# Patient Record
Sex: MALE | Race: WHITE | NOT HISPANIC OR LATINO | Employment: OTHER | URBAN - METROPOLITAN AREA
[De-identification: names, ages, dates, MRNs, and addresses within clinical notes are randomized per-mention and may not be internally consistent; named-entity substitution may affect disease eponyms.]

---

## 2021-01-05 ENCOUNTER — HOSPITAL ENCOUNTER (OUTPATIENT)
Dept: RADIOLOGY | Facility: HOSPITAL | Age: 66
Discharge: HOME | End: 2021-01-05
Attending: ORTHOPAEDIC SURGERY
Payer: COMMERCIAL

## 2021-01-05 ENCOUNTER — OFFICE VISIT (OUTPATIENT)
Dept: ORTHOPEDICS | Facility: CLINIC | Age: 66
End: 2021-01-05
Payer: COMMERCIAL

## 2021-01-05 VITALS
HEART RATE: 62 BPM | HEIGHT: 69 IN | DIASTOLIC BLOOD PRESSURE: 97 MMHG | BODY MASS INDEX: 43.3 KG/M2 | SYSTOLIC BLOOD PRESSURE: 171 MMHG | WEIGHT: 292.38 LBS

## 2021-01-05 DIAGNOSIS — M25.562 LEFT KNEE PAIN, UNSPECIFIED CHRONICITY: Primary | ICD-10-CM

## 2021-01-05 DIAGNOSIS — M25.562 LEFT KNEE PAIN, UNSPECIFIED CHRONICITY: ICD-10-CM

## 2021-01-05 DIAGNOSIS — M25.561 RIGHT KNEE PAIN, UNSPECIFIED CHRONICITY: ICD-10-CM

## 2021-01-05 DIAGNOSIS — M17.12 PRIMARY OSTEOARTHRITIS OF LEFT KNEE: ICD-10-CM

## 2021-01-05 DIAGNOSIS — M17.11 PRIMARY OSTEOARTHRITIS OF RIGHT KNEE: ICD-10-CM

## 2021-01-05 PROCEDURE — 3008F BODY MASS INDEX DOCD: CPT | Mod: CPTII,S$GLB,, | Performed by: ORTHOPAEDIC SURGERY

## 2021-01-05 PROCEDURE — 73564 X-RAY EXAM KNEE 4 OR MORE: CPT | Mod: TC,50,FY

## 2021-01-05 PROCEDURE — 20610 DRAIN/INJ JOINT/BURSA W/O US: CPT | Mod: 50,S$GLB,, | Performed by: ORTHOPAEDIC SURGERY

## 2021-01-05 PROCEDURE — 99999 PR PBB SHADOW E&M-NEW PATIENT-LVL III: ICD-10-PCS | Mod: PBBFAC,,, | Performed by: ORTHOPAEDIC SURGERY

## 2021-01-05 PROCEDURE — 73564 XR KNEE COMP 4 OR MORE VIEWS BILAT: ICD-10-PCS | Mod: 26,,, | Performed by: RADIOLOGY

## 2021-01-05 PROCEDURE — 1125F PR PAIN SEVERITY QUANTIFIED, PAIN PRESENT: ICD-10-PCS | Mod: S$GLB,,, | Performed by: ORTHOPAEDIC SURGERY

## 2021-01-05 PROCEDURE — 73564 X-RAY EXAM KNEE 4 OR MORE: CPT | Mod: 26,,, | Performed by: RADIOLOGY

## 2021-01-05 PROCEDURE — 20610 LARGE JOINT ASPIRATION/INJECTION: BILATERAL KNEE: ICD-10-PCS | Mod: 50,S$GLB,, | Performed by: ORTHOPAEDIC SURGERY

## 2021-01-05 PROCEDURE — 99244 OFF/OP CNSLTJ NEW/EST MOD 40: CPT | Mod: 25,S$GLB,, | Performed by: ORTHOPAEDIC SURGERY

## 2021-01-05 PROCEDURE — 99999 PR PBB SHADOW E&M-NEW PATIENT-LVL III: CPT | Mod: PBBFAC,,, | Performed by: ORTHOPAEDIC SURGERY

## 2021-01-05 PROCEDURE — 99244 PR OFFICE CONSULTATION,LEVEL IV: ICD-10-PCS | Mod: 25,S$GLB,, | Performed by: ORTHOPAEDIC SURGERY

## 2021-01-05 PROCEDURE — 1125F AMNT PAIN NOTED PAIN PRSNT: CPT | Mod: S$GLB,,, | Performed by: ORTHOPAEDIC SURGERY

## 2021-01-05 PROCEDURE — 3008F PR BODY MASS INDEX (BMI) DOCUMENTED: ICD-10-PCS | Mod: CPTII,S$GLB,, | Performed by: ORTHOPAEDIC SURGERY

## 2021-01-05 RX ORDER — AMLODIPINE BESYLATE 2.5 MG/1
TABLET ORAL
COMMUNITY
Start: 2020-12-30

## 2021-01-05 RX ORDER — LIDOCAINE HYDROCHLORIDE 10 MG/ML
2 INJECTION INFILTRATION; PERINEURAL
Status: DISCONTINUED | OUTPATIENT
Start: 2021-01-05 | End: 2021-01-05 | Stop reason: HOSPADM

## 2021-01-05 RX ORDER — SIMVASTATIN 20 MG/1
TABLET, FILM COATED ORAL
COMMUNITY
Start: 2020-12-06 | End: 2023-08-31

## 2021-01-05 RX ORDER — METOPROLOL SUCCINATE 100 MG/1
TABLET, EXTENDED RELEASE ORAL
COMMUNITY
Start: 2020-12-13

## 2021-01-05 RX ORDER — KETOROLAC TROMETHAMINE 30 MG/ML
30 INJECTION, SOLUTION INTRAMUSCULAR; INTRAVENOUS
Status: DISCONTINUED | OUTPATIENT
Start: 2021-01-05 | End: 2021-01-05 | Stop reason: HOSPADM

## 2021-01-05 RX ORDER — BUPIVACAINE HYDROCHLORIDE 5 MG/ML
10 INJECTION, SOLUTION PERINEURAL
Status: DISCONTINUED | OUTPATIENT
Start: 2021-01-05 | End: 2021-01-05 | Stop reason: HOSPADM

## 2021-01-05 RX ORDER — LISINOPRIL AND HYDROCHLOROTHIAZIDE 12.5; 2 MG/1; MG/1
TABLET ORAL
COMMUNITY
Start: 2020-12-23

## 2021-01-05 RX ADMIN — BUPIVACAINE HYDROCHLORIDE 10 ML: 5 INJECTION, SOLUTION PERINEURAL at 01:01

## 2021-01-05 RX ADMIN — KETOROLAC TROMETHAMINE 30 MG: 30 INJECTION, SOLUTION INTRAMUSCULAR; INTRAVENOUS at 01:01

## 2021-01-05 RX ADMIN — LIDOCAINE HYDROCHLORIDE 2 ML: 10 INJECTION INFILTRATION; PERINEURAL at 01:01

## 2023-02-02 ENCOUNTER — OFFICE VISIT (OUTPATIENT)
Dept: ORTHOPEDICS | Facility: CLINIC | Age: 68
End: 2023-02-02
Payer: MEDICARE

## 2023-02-02 VITALS
WEIGHT: 273.81 LBS | HEIGHT: 69 IN | SYSTOLIC BLOOD PRESSURE: 151 MMHG | HEART RATE: 65 BPM | DIASTOLIC BLOOD PRESSURE: 72 MMHG | BODY MASS INDEX: 40.56 KG/M2

## 2023-02-02 DIAGNOSIS — G89.29 CHRONIC PAIN OF RIGHT KNEE: ICD-10-CM

## 2023-02-02 DIAGNOSIS — M17.11 PRIMARY OSTEOARTHRITIS OF RIGHT KNEE: Primary | ICD-10-CM

## 2023-02-02 DIAGNOSIS — M25.561 CHRONIC PAIN OF RIGHT KNEE: ICD-10-CM

## 2023-02-02 PROCEDURE — 20610 DRAIN/INJ JOINT/BURSA W/O US: CPT | Mod: PBBFAC,RT | Performed by: ORTHOPAEDIC SURGERY

## 2023-02-02 PROCEDURE — 20610 LARGE JOINT ASPIRATION/INJECTION: R KNEE: ICD-10-PCS | Mod: S$PBB,RT,, | Performed by: ORTHOPAEDIC SURGERY

## 2023-02-02 PROCEDURE — 99213 PR OFFICE/OUTPT VISIT, EST, LEVL III, 20-29 MIN: ICD-10-PCS | Mod: 25,S$PBB,, | Performed by: ORTHOPAEDIC SURGERY

## 2023-02-02 PROCEDURE — 99999 PR PBB SHADOW E&M-EST. PATIENT-LVL IV: CPT | Mod: PBBFAC,,, | Performed by: ORTHOPAEDIC SURGERY

## 2023-02-02 PROCEDURE — 20610 DRAIN/INJ JOINT/BURSA W/O US: CPT | Mod: PBBFAC,PN | Performed by: ORTHOPAEDIC SURGERY

## 2023-02-02 PROCEDURE — 99999 PR PBB SHADOW E&M-EST. PATIENT-LVL IV: ICD-10-PCS | Mod: PBBFAC,,, | Performed by: ORTHOPAEDIC SURGERY

## 2023-02-02 PROCEDURE — 99213 OFFICE O/P EST LOW 20 MIN: CPT | Mod: 25,S$PBB,, | Performed by: ORTHOPAEDIC SURGERY

## 2023-02-02 PROCEDURE — 99214 OFFICE O/P EST MOD 30 MIN: CPT | Mod: PBBFAC,PN,25 | Performed by: ORTHOPAEDIC SURGERY

## 2023-02-02 RX ORDER — KETOROLAC TROMETHAMINE 30 MG/ML
30 INJECTION, SOLUTION INTRAMUSCULAR; INTRAVENOUS
Status: DISCONTINUED | OUTPATIENT
Start: 2023-02-02 | End: 2023-02-02 | Stop reason: HOSPADM

## 2023-02-02 RX ORDER — BUPIVACAINE HYDROCHLORIDE 5 MG/ML
5 INJECTION, SOLUTION PERINEURAL
Status: DISCONTINUED | OUTPATIENT
Start: 2023-02-02 | End: 2023-02-02 | Stop reason: HOSPADM

## 2023-02-02 RX ORDER — LIDOCAINE HYDROCHLORIDE 10 MG/ML
4 INJECTION INFILTRATION; PERINEURAL
Status: DISCONTINUED | OUTPATIENT
Start: 2023-02-02 | End: 2023-02-02 | Stop reason: HOSPADM

## 2023-02-02 RX ADMIN — LIDOCAINE HYDROCHLORIDE 4 ML: 10 INJECTION INFILTRATION; PERINEURAL at 01:02

## 2023-02-02 RX ADMIN — BUPIVACAINE HYDROCHLORIDE 5 ML: 5 INJECTION, SOLUTION PERINEURAL at 01:02

## 2023-02-02 RX ADMIN — KETOROLAC TROMETHAMINE 30 MG: 30 INJECTION, SOLUTION INTRAMUSCULAR; INTRAVENOUS at 01:02

## 2023-02-02 NOTE — PROGRESS NOTES
[facility] [address]          Subjective:      Patient ID: Diallo Castano is a 67 y.o. male.    Chief Complaint: Pain and Swelling of the Right Knee    Diallo Castano is a 67-year-old male with a PMHx HTN who presents to clinic today for complaint of chronic bilateral knee pain (R > L). Occurring now for the past 2 1/2 years. Pain is diffusely over the knees, worse with prolonged ambulation or prolonged times of inactivity. Endorses associated knee stifiness worse after prolonged immobility. Also endorses swelling of the right knee. Endorses he mostly does not take anything for pain but will occassionally take tylenol or aspirin. Endorses history of past knee procedures around 20 years ago on both the right and left knees which sounds like meniscal debridements. Patient was seen in ortho clinic in January 2021 for initial ortho evaluation. Received Torodol injections to both knees but says the pain relief only lasted around 1 week.         Past Medical History:   Diagnosis Date    Arthritis         Past Surgical History:   Procedure Laterality Date    KNEE SURGERY          Current Outpatient Medications   Medication Instructions    amLODIPine (NORVASC) 2.5 MG tablet No dose, route, or frequency recorded.    lisinopriL-hydrochlorothiazide (PRINZIDE,ZESTORETIC) 20-12.5 mg per tablet No dose, route, or frequency recorded.    metoprolol succinate (TOPROL-XL) 100 MG 24 hr tablet No dose, route, or frequency recorded.    simvastatin (ZOCOR) 20 MG tablet No dose, route, or frequency recorded.        Review of patient's allergies indicates:  No Known Allergies    Social History     Socioeconomic History    Marital status:    Tobacco Use    Smoking status: Never    Smokeless tobacco: Never   Substance and Sexual Activity    Alcohol use: Yes    Drug use: Never   Social History Narrative    ** Merged History Encounter **            History reviewed. No pertinent family history.      Review of Systems    Constitutional: Negative for chills and fever.   HENT:  Negative for congestion.    Musculoskeletal:  Positive for arthritis, joint pain, joint swelling and stiffness. Negative for back pain and falls.   Gastrointestinal:  Negative for abdominal pain.   Neurological:  Negative for paresthesias.           Objective:        General    Constitutional: He is oriented to person, place, and time. He appears well-developed.   HENT:   Head: Normocephalic and atraumatic.   Right Ear: External ear normal.   Left Ear: External ear normal.   Eyes: EOM are normal.   Cardiovascular:  Intact distal pulses.            Neurological: He is alert and oriented to person, place, and time.   Psychiatric: He has a normal mood and affect. His behavior is normal. Judgment and thought content normal.     General Musculoskeletal Exam   Gait: antalgic and abnormal       Right Knee Exam     Inspection   Swelling: present  Effusion: present    Tenderness   The patient is tender to palpation of the medial joint line.    Crepitus   The patient has crepitus of the patella.    Range of Motion   Flexion:  abnormal     Other   Sensation: normal    Muscle Strength   Right Lower Extremity   Quadriceps:  4/5   Hamstrin/5       we injected the right knee w 1cc of ketorolac, marcaine and lidocaine under sterile conditions with the patient's informed consent for severe bone on bone knee oa . Will plan for tka in may

## 2023-02-02 NOTE — PROCEDURES
Large Joint Aspiration/Injection: R knee    Date/Time: 2/2/2023 1:30 PM  Performed by: Lowell Woodson MD  Authorized by: Lowell Woodson MD     Consent Done?:  Yes (Verbal)  Indications:  Arthritis  Site marked: the procedure site was marked    Timeout: prior to procedure the correct patient, procedure, and site was verified    Prep: patient was prepped and draped in usual sterile fashion      Local anesthesia used?: Yes    Local anesthetic:  Topical anesthetic    Details:  Needle Size:  22 G  Ultrasonic Guidance for needle placement?: No    Approach:  Anterolateral  Location:  Knee  Site:  R knee  Medications:  30 mg ketorolac 30 mg/mL (1 mL); 5 mL BUPivacaine 0.5 % (5 mg/mL); 4 mL LIDOcaine HCL 10 mg/ml (1%) 10 mg/mL (1 %)  Patient tolerance:  Patient tolerated the procedure well with no immediate complications

## 2023-03-30 ENCOUNTER — OFFICE VISIT (OUTPATIENT)
Dept: ORTHOPEDICS | Facility: CLINIC | Age: 68
End: 2023-03-30
Payer: MEDICARE

## 2023-03-30 ENCOUNTER — LAB VISIT (OUTPATIENT)
Dept: LAB | Facility: HOSPITAL | Age: 68
End: 2023-03-30
Attending: ORTHOPAEDIC SURGERY
Payer: MEDICARE

## 2023-03-30 VITALS
HEART RATE: 62 BPM | SYSTOLIC BLOOD PRESSURE: 164 MMHG | DIASTOLIC BLOOD PRESSURE: 95 MMHG | BODY MASS INDEX: 41.37 KG/M2 | WEIGHT: 279.31 LBS | HEIGHT: 69 IN

## 2023-03-30 DIAGNOSIS — M25.561 CHRONIC PAIN OF RIGHT KNEE: ICD-10-CM

## 2023-03-30 DIAGNOSIS — G89.29 CHRONIC PAIN OF RIGHT KNEE: ICD-10-CM

## 2023-03-30 DIAGNOSIS — M17.11 PRIMARY OSTEOARTHRITIS OF RIGHT KNEE: Primary | ICD-10-CM

## 2023-03-30 DIAGNOSIS — M17.11 PRIMARY OSTEOARTHRITIS OF RIGHT KNEE: ICD-10-CM

## 2023-03-30 LAB
ALBUMIN SERPL BCP-MCNC: 4.6 G/DL (ref 3.5–5.2)
ALBUMIN SERPL BCP-MCNC: 4.6 G/DL (ref 3.5–5.2)
ALP SERPL-CCNC: 41 U/L (ref 55–135)
ALT SERPL W/O P-5'-P-CCNC: 41 U/L (ref 10–44)
ANION GAP SERPL CALC-SCNC: 13 MMOL/L (ref 8–16)
AST SERPL-CCNC: 22 U/L (ref 10–40)
BASOPHILS # BLD AUTO: 0.04 K/UL (ref 0–0.2)
BASOPHILS NFR BLD: 0.5 % (ref 0–1.9)
BILIRUB SERPL-MCNC: 0.7 MG/DL (ref 0.1–1)
BUN SERPL-MCNC: 16 MG/DL (ref 8–23)
CALCIUM SERPL-MCNC: 9.5 MG/DL (ref 8.7–10.5)
CHLORIDE SERPL-SCNC: 102 MMOL/L (ref 95–110)
CO2 SERPL-SCNC: 25 MMOL/L (ref 23–29)
CREAT SERPL-MCNC: 1 MG/DL (ref 0.5–1.4)
CRP SERPL-MCNC: 0.8 MG/L (ref 0–8.2)
DIFFERENTIAL METHOD: ABNORMAL
EOSINOPHIL # BLD AUTO: 0.1 K/UL (ref 0–0.5)
EOSINOPHIL NFR BLD: 1.7 % (ref 0–8)
ERYTHROCYTE [DISTWIDTH] IN BLOOD BY AUTOMATED COUNT: 13.2 % (ref 11.5–14.5)
ERYTHROCYTE [SEDIMENTATION RATE] IN BLOOD BY PHOTOMETRIC METHOD: 6 MM/HR (ref 0–23)
EST. GFR  (NO RACE VARIABLE): >60 ML/MIN/1.73 M^2
GLUCOSE SERPL-MCNC: 113 MG/DL (ref 70–110)
HCT VFR BLD AUTO: 45.3 % (ref 40–54)
HGB BLD-MCNC: 15.3 G/DL (ref 14–18)
IMM GRANULOCYTES # BLD AUTO: 0.02 K/UL (ref 0–0.04)
IMM GRANULOCYTES NFR BLD AUTO: 0.3 % (ref 0–0.5)
LYMPHOCYTES # BLD AUTO: 2.8 K/UL (ref 1–4.8)
LYMPHOCYTES NFR BLD: 37 % (ref 18–48)
MCH RBC QN AUTO: 32.3 PG (ref 27–31)
MCHC RBC AUTO-ENTMCNC: 33.8 G/DL (ref 32–36)
MCV RBC AUTO: 96 FL (ref 82–98)
MONOCYTES # BLD AUTO: 0.8 K/UL (ref 0.3–1)
MONOCYTES NFR BLD: 10.1 % (ref 4–15)
NEUTROPHILS # BLD AUTO: 3.8 K/UL (ref 1.8–7.7)
NEUTROPHILS NFR BLD: 50.4 % (ref 38–73)
NRBC BLD-RTO: 0 /100 WBC
PLATELET # BLD AUTO: 216 K/UL (ref 150–450)
PMV BLD AUTO: 10.2 FL (ref 9.2–12.9)
POTASSIUM SERPL-SCNC: 4.4 MMOL/L (ref 3.5–5.1)
PREALB SERPL-MCNC: 47 MG/DL (ref 20–43)
PROT SERPL-MCNC: 7.7 G/DL (ref 6–8.4)
RBC # BLD AUTO: 4.73 M/UL (ref 4.6–6.2)
SODIUM SERPL-SCNC: 140 MMOL/L (ref 136–145)
WBC # BLD AUTO: 7.55 K/UL (ref 3.9–12.7)

## 2023-03-30 PROCEDURE — 84134 ASSAY OF PREALBUMIN: CPT | Performed by: ORTHOPAEDIC SURGERY

## 2023-03-30 PROCEDURE — 36415 COLL VENOUS BLD VENIPUNCTURE: CPT | Performed by: ORTHOPAEDIC SURGERY

## 2023-03-30 PROCEDURE — 99999 PR PBB SHADOW E&M-EST. PATIENT-LVL IV: ICD-10-PCS | Mod: PBBFAC,,, | Performed by: ORTHOPAEDIC SURGERY

## 2023-03-30 PROCEDURE — 99999 PR PBB SHADOW E&M-EST. PATIENT-LVL IV: CPT | Mod: PBBFAC,,, | Performed by: ORTHOPAEDIC SURGERY

## 2023-03-30 PROCEDURE — 99215 PR OFFICE/OUTPT VISIT, EST, LEVL V, 40-54 MIN: ICD-10-PCS | Mod: S$PBB,,, | Performed by: ORTHOPAEDIC SURGERY

## 2023-03-30 PROCEDURE — 99215 OFFICE O/P EST HI 40 MIN: CPT | Mod: S$PBB,,, | Performed by: ORTHOPAEDIC SURGERY

## 2023-03-30 PROCEDURE — 85025 COMPLETE CBC W/AUTO DIFF WBC: CPT | Performed by: ORTHOPAEDIC SURGERY

## 2023-03-30 PROCEDURE — 99214 OFFICE O/P EST MOD 30 MIN: CPT | Mod: PBBFAC,PN | Performed by: ORTHOPAEDIC SURGERY

## 2023-03-30 PROCEDURE — 85652 RBC SED RATE AUTOMATED: CPT | Performed by: ORTHOPAEDIC SURGERY

## 2023-03-30 PROCEDURE — 80053 COMPREHEN METABOLIC PANEL: CPT | Performed by: ORTHOPAEDIC SURGERY

## 2023-03-30 PROCEDURE — 86140 C-REACTIVE PROTEIN: CPT | Performed by: ORTHOPAEDIC SURGERY

## 2023-03-30 NOTE — PROGRESS NOTES
Subjective:      Patient ID: Diallo Castano is a 67 y.o. male.    Chief Complaint: Pain of the Right Knee    Knee Pain: right  swelling: yes  worsens with activity: yes  relieved by: injections       Social History     Occupational History    Not on file   Tobacco Use    Smoking status: Never    Smokeless tobacco: Never   Substance and Sexual Activity    Alcohol use: Yes    Drug use: Never    Sexual activity: Not on file      ROS      Objective:    Ortho/SPM Exam       Assessment:       1. Primary osteoarthritis of right knee    2. Chronic pain of right knee          Plan:       The patient has failed non operative management, has painful crepitus, and has swelling. The natural history of severe degenerative arthritis was discussed at length and nonoperative and operative treatment was reviewed. Due to the pain and associated disability, I recommend right total knee replacement for KL 4.  The risks, benefits, convalescence, and alternatives were reviewed.  Numerous questions were asked and answered.  Models were used as an education tool.  Surgery will be scheduled at a convenient time.  The discussion with the patient included a description of a total knee replacement.  A total knee replacement (TKR) means a resurfacing of all three surfaces-the patella, femur, and tibia, with metal and plastic parts. The prosthetic parts are usually cemented into position and will allow a patient a full range of motion from. The postoperative motion, however, is determined by multiple factors, the most important of which is preoperative motion. In general, the better the motion preoperatively, the better the motion postoperatively.  In patients with good bone stock and bone quality (ie males, younger patients) I will generally use an uncemented tibial component and leave the patella unresurfaced, in an effort to preserve bone stock for any future revision surgeries. In those patients we discuss the risk of anterior knee pain  in a small subset of patients (5-10%) with an unresurfaced patella. The operation, pending medical clearance, generally requires a hospitalization of 0-3 days for one knee (possibly longer for a bilateral replacement). In general, we prefer to perform the procedure under epidural/spinal anesthesia.  Patient blood donation will be based on the individual patient but is not common and based on preoperative hemoglobin/hematocrit levels.The operation will be done preferably in a minimally invasive fashion which will facilitate recovery.  While performing the procedure in a minimally invasive manner is our preference, some patients are not candidates.  In that situation, a non-minimally invasive technique will be performed.  This will not adversely affect the long term success of the TKR.  Postoperatively, the patient is  walking the day of surgery and may be discahrged the day of surgery if stable with a walker or cane.  The first couple of days can be painful and the pain medication will alleviate but not eliminate the pain.  Thus, the patient must really push hard to get the range of motion.   The cane is to be dispensed with once the patient is secure enough.  In general, there is no cast or brace required with a routine knee replacement. In the long term, we do not encourage our patients to run for the sake of running; although, pending their preoperative status, we often allow patients to play doubles tennis or comparable activities.  We also allow them to do gentle intermediate downhill skiing if they are truly an expert skier.  Biking is encouraged as well as swimming.  The follow-up periods are usually at 2 weeks, 3 months, six months, and yearly intervals.  Potential complications with total knee replacements include infection (less than 2%), which is much higher in patients with obesity, diabetes, or other conditions which adversely affect the immune system.  (Patients with a previous history of osteomyelitis  can have infection rates as high as 15%).  By nerve damage we mean a peroneal nerve palsy with a foot drop (a flapping foot with ambulation).  This is particularly more apt to occur with a bad valgus (knock knee) deformity.  The incidence can be quite high in this particular patient population.  There are always areas of skin numbness, but this is not an untoward effect, nor do we consider it a complication.  Other potential complications include dislocation of the patellar component (less than 2%).  The loosening of either the tibial or femoral component is much more infrequent.  It usually occurs with infection or long-term use in a patient population that is at extreme risk (e.g., markedly overweight and not conscientious about their exercises).  Major blood vessel damage is also extremely rare.  Theoretically, because of the anatomic proximity of the popliteal artery, it could be lacerated with subsequent repairs required.  Albeit unlikely, a disruption of the popliteal artery could theoretically result in an amputation.  Similarly, infection could theoretically result in an amputation if one were to grow out an organism that cannot be controlled with antibiotics.  General medical complications include phlebitis for which we prophylactically anticoagulate, but it could still occur and fatal pulmonary embolus has been reported.  Cardiovascular problems, such as a heart attack or ischemia, are always a concern with such hemodynamic changes in the blood vascular system.  Our goal is to improve at least 80% of the pain and hopefully 100% but some aspects of the patients anatomy or other factors may not provide complete pain relief. Other general complications are very rare but in medicine anything could theoretically happen. We also discussed performing a pre-operative peripheral sensory block of the bracnhes of the AFCN and ISN of the same knee using iovera to help with pain control post surgery. This is a  relatively new technology with early data demonstrating significant pain improvement and functional recovery. However the science defining all the associated risks is still developing. From what we know thus far, a small number of patients can have nerve pain along the areas of the treated nerves. I think the patient understands the risk benefit ratio of total knee replacement and the iovera treatment and would like to pursue the total knee replacement and iovera treatment. we will begin the pre-operative process.

## 2023-04-04 LAB — IL6 SERPL-MCNC: 3.2 PG/ML

## 2023-04-26 ENCOUNTER — OFFICE VISIT (OUTPATIENT)
Dept: FAMILY MEDICINE | Facility: HOSPITAL | Age: 68
End: 2023-04-26
Attending: FAMILY MEDICINE
Payer: MEDICARE

## 2023-04-26 ENCOUNTER — HOSPITAL ENCOUNTER (OUTPATIENT)
Dept: RADIOLOGY | Facility: HOSPITAL | Age: 68
Discharge: HOME | End: 2023-04-26
Attending: ORTHOPAEDIC SURGERY
Payer: MEDICARE

## 2023-04-26 ENCOUNTER — ANESTHESIA EVENT (OUTPATIENT)
Dept: SURGERY | Facility: HOSPITAL | Age: 68
End: 2023-04-26
Payer: MEDICARE

## 2023-04-26 ENCOUNTER — HOSPITAL ENCOUNTER (OUTPATIENT)
Dept: PREADMISSION TESTING | Facility: HOSPITAL | Age: 68
Discharge: HOME | End: 2023-04-26
Attending: ORTHOPAEDIC SURGERY
Payer: MEDICARE

## 2023-04-26 ENCOUNTER — CLINICAL SUPPORT (OUTPATIENT)
Dept: LAB | Facility: HOSPITAL | Age: 68
End: 2023-04-26
Attending: ORTHOPAEDIC SURGERY
Payer: MEDICARE

## 2023-04-26 VITALS — BODY MASS INDEX: 41.86 KG/M2 | WEIGHT: 282.63 LBS | OXYGEN SATURATION: 95 % | HEIGHT: 69 IN | HEART RATE: 62 BPM

## 2023-04-26 VITALS
SYSTOLIC BLOOD PRESSURE: 184 MMHG | WEIGHT: 283.5 LBS | DIASTOLIC BLOOD PRESSURE: 81 MMHG | HEART RATE: 69 BPM | BODY MASS INDEX: 41.99 KG/M2 | HEIGHT: 69 IN

## 2023-04-26 DIAGNOSIS — G89.29 CHRONIC PAIN OF RIGHT KNEE: ICD-10-CM

## 2023-04-26 DIAGNOSIS — M25.561 CHRONIC PAIN OF RIGHT KNEE: ICD-10-CM

## 2023-04-26 DIAGNOSIS — I10 ESSENTIAL HYPERTENSION: ICD-10-CM

## 2023-04-26 DIAGNOSIS — M17.11 PRIMARY OSTEOARTHRITIS OF RIGHT KNEE: ICD-10-CM

## 2023-04-26 DIAGNOSIS — Z01.818 PRE-OP EXAMINATION: Primary | ICD-10-CM

## 2023-04-26 DIAGNOSIS — Q23.1 BICUSPID AORTIC VALVE: ICD-10-CM

## 2023-04-26 PROBLEM — E78.00 PURE HYPERCHOLESTEROLEMIA: Status: ACTIVE | Noted: 2022-09-08

## 2023-04-26 PROBLEM — R73.9 HYPERGLYCEMIA: Status: ACTIVE | Noted: 2022-05-26

## 2023-04-26 PROBLEM — E78.01 FAMILIAL HYPERCHOLESTEROLEMIA: Status: ACTIVE | Noted: 2022-05-26

## 2023-04-26 PROBLEM — E11.9 TYPE 2 DIABETES MELLITUS WITHOUT COMPLICATION, WITHOUT LONG-TERM CURRENT USE OF INSULIN: Status: ACTIVE | Noted: 2022-09-08

## 2023-04-26 PROBLEM — R76.8 ANA POSITIVE: Status: ACTIVE | Noted: 2022-09-08

## 2023-04-26 PROCEDURE — 93010 EKG 12-LEAD: ICD-10-PCS | Mod: ,,, | Performed by: INTERNAL MEDICINE

## 2023-04-26 PROCEDURE — 99213 OFFICE O/P EST LOW 20 MIN: CPT | Mod: 25 | Performed by: STUDENT IN AN ORGANIZED HEALTH CARE EDUCATION/TRAINING PROGRAM

## 2023-04-26 PROCEDURE — 71045 X-RAY EXAM CHEST 1 VIEW: CPT | Mod: TC,FY

## 2023-04-26 PROCEDURE — 71045 X-RAY EXAM CHEST 1 VIEW: CPT | Mod: 26,,, | Performed by: RADIOLOGY

## 2023-04-26 PROCEDURE — 93010 ELECTROCARDIOGRAM REPORT: CPT | Mod: ,,, | Performed by: INTERNAL MEDICINE

## 2023-04-26 PROCEDURE — 77073 XR HIP TO ANKLE: ICD-10-PCS | Mod: 26,,, | Performed by: RADIOLOGY

## 2023-04-26 PROCEDURE — 93005 ELECTROCARDIOGRAM TRACING: CPT

## 2023-04-26 PROCEDURE — 71045 XR CHEST 1 VIEW PRE-OP: ICD-10-PCS | Mod: 26,,, | Performed by: RADIOLOGY

## 2023-04-26 PROCEDURE — 77073 BONE LENGTH STUDIES: CPT | Mod: TC,FY

## 2023-04-26 PROCEDURE — 77073 BONE LENGTH STUDIES: CPT | Mod: 26,,, | Performed by: RADIOLOGY

## 2023-04-26 RX ORDER — SODIUM CHLORIDE, SODIUM LACTATE, POTASSIUM CHLORIDE, CALCIUM CHLORIDE 600; 310; 30; 20 MG/100ML; MG/100ML; MG/100ML; MG/100ML
INJECTION, SOLUTION INTRAVENOUS CONTINUOUS
Status: CANCELLED | OUTPATIENT
Start: 2023-04-26

## 2023-04-26 RX ORDER — LIDOCAINE HYDROCHLORIDE 10 MG/ML
1 INJECTION, SOLUTION EPIDURAL; INFILTRATION; INTRACAUDAL; PERINEURAL ONCE
Status: CANCELLED | OUTPATIENT
Start: 2023-04-26 | End: 2023-04-26

## 2023-04-26 NOTE — H&P (VIEW-ONLY)
"Progress Note  [facility]    Subjective:     Diallo Castano is a 67 y.o. year old male with PMHx of bicuspid aortic valve, HTN, HLD, T2DM who presents to clinic for pre-op evaluation.     Pt scheduled for TKA 5/15/23. BP adequately controlled at baseline. Did not take his medications this morning. Home /65 typically. Hx bicuspid aortic valve, established with cardiology. Asymptomatic. Elevated calcium score but stress test negative. Never tobacco user. Taking baby ASA daily. No prior difficulties with surgery. No allergies to antibiotics. Has seen cardiologist who cleared him for surgery about 2 weeks ago.     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    Pure hypercholesterolemia 09/08/2022    Type 2 diabetes mellitus without complication, without long-term current use of insulin 09/08/2022    Bicuspid aortic valve 08/26/2022    Familial hypercholesterolemia 05/26/2022    Hyperglycemia 05/26/2022    Primary osteoarthritis of right knee 01/05/2021    Primary osteoarthritis of left knee 01/05/2021    Heart murmur 03/21/2016    Prediabetes 03/21/2016    Essential hypertension 08/27/2014    Hyperlipidemia 05/01/2013        Review of patient's allergies indicates:  No Known Allergies     Past Medical History:   Diagnosis Date    Arthritis       Past Surgical History:   Procedure Laterality Date    KNEE SURGERY        No family history on file.   Social History     Tobacco Use    Smoking status: Never    Smokeless tobacco: Never   Substance Use Topics    Alcohol use: Yes        Objective:     Vitals:    04/26/23 1048   BP: (!) 184/81   Pulse: 69   Weight: 128.6 kg (283 lb 8.2 oz)   Height: 5' 9" (1.753 m)     BMI: 41.87    Gen: No apparent distress, well nourished and developed, appears stated age  CV: RRR, S1 and S2 present, no LE edema, aortic murmur that radiates to the carotids  Resp: CTAB, normal respiratory " effort  Neuro: Walks with a mild limp    Assessment/Plan:     Diallo Castano is a 67 y.o. year old male who presents to clinic for per-op clearance    1. Pre-op examination  Pt states cardiology already cleared for surgery. Discussed need for medication compliance with HTN however appears adequately controlled at baseline based on home numbers. Given cardiology has assess for pre-op optimization and is comfortable with surgery pt medically optimized for surgery at this time. Discussed need to hold ASA 5 days prior to surgery. Pt verbalized understanding and was in agreement with the plan.    - Revised cardiac risk index: 0, class I risk  - NSQIP: below average to average risk of all adverse outcomes except above average risk for surgical site infection and renal failure.     2. Bicuspid aortic valve  As above    3. Essential hypertension  As above.         Follow-up: for routine healthcare with PCP    A total of 35 minutes was spent on patient care during this encounter which included chart review, examining the patient, formulating a treatment plan and documentation.     Medical decision making straight forward and not complex during this visit.     Case discussed with staff: Dr. Sav Villarreal DO  [facility], PGY-3

## 2023-04-26 NOTE — PROGRESS NOTES
"Progress Note  [facility]    Subjective:     Diallo Castano is a 67 y.o. year old male with PMHx of bicuspid aortic valve, HTN, HLD, T2DM who presents to clinic for pre-op evaluation.     Pt scheduled for TKA 5/15/23. BP adequately controlled at baseline. Did not take his medications this morning. Home /65 typically. Hx bicuspid aortic valve, established with cardiology. Asymptomatic. Elevated calcium score but stress test negative. Never tobacco user. Taking baby ASA daily. No prior difficulties with surgery. No allergies to antibiotics. Has seen cardiologist who cleared him for surgery about 2 weeks ago.     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    Pure hypercholesterolemia 09/08/2022    Type 2 diabetes mellitus without complication, without long-term current use of insulin 09/08/2022    Bicuspid aortic valve 08/26/2022    Familial hypercholesterolemia 05/26/2022    Hyperglycemia 05/26/2022    Primary osteoarthritis of right knee 01/05/2021    Primary osteoarthritis of left knee 01/05/2021    Heart murmur 03/21/2016    Prediabetes 03/21/2016    Essential hypertension 08/27/2014    Hyperlipidemia 05/01/2013        Review of patient's allergies indicates:  No Known Allergies     Past Medical History:   Diagnosis Date    Arthritis       Past Surgical History:   Procedure Laterality Date    KNEE SURGERY        No family history on file.   Social History     Tobacco Use    Smoking status: Never    Smokeless tobacco: Never   Substance Use Topics    Alcohol use: Yes        Objective:     Vitals:    04/26/23 1048   BP: (!) 184/81   Pulse: 69   Weight: 128.6 kg (283 lb 8.2 oz)   Height: 5' 9" (1.753 m)     BMI: 41.87    Gen: No apparent distress, well nourished and developed, appears stated age  CV: RRR, S1 and S2 present, no LE edema, aortic murmur that radiates to the carotids  Resp: CTAB, normal respiratory " effort  Neuro: Walks with a mild limp    Assessment/Plan:     Diallo Castano is a 67 y.o. year old male who presents to clinic for per-op clearance    1. Pre-op examination  Pt states cardiology already cleared for surgery. Discussed need for medication compliance with HTN however appears adequately controlled at baseline based on home numbers. Given cardiology has assess for pre-op optimization and is comfortable with surgery pt medically optimized for surgery at this time. Discussed need to hold ASA 5 days prior to surgery. Pt verbalized understanding and was in agreement with the plan.    - Revised cardiac risk index: 0, class I risk  - NSQIP: below average to average risk of all adverse outcomes except above average risk for surgical site infection and renal failure.     2. Bicuspid aortic valve  As above    3. Essential hypertension  As above.         Follow-up: for routine healthcare with PCP    A total of 35 minutes was spent on patient care during this encounter which included chart review, examining the patient, formulating a treatment plan and documentation.     Medical decision making straight forward and not complex during this visit.     Case discussed with staff: Dr. Sav Villarreal DO  [facility], PGY-3

## 2023-04-26 NOTE — DISCHARGE INSTRUCTIONS
Your surgery is scheduled for 5/15/23.    Please report to [facility] on the 1st Floor at 1000 a.m.    THIS TIME IS SUBJECT TO CHANGE.  YOU WILL RECEIVE A PHONE CALL THE DAY BEFORE SURGERY BY 3:30 PM TO CONFIRM YOUR TIME OF ARRIVAL.  IF YOU HAVE NOT RECEIVED A PHONE CALL BY 3:30 PM THE DAY BEFORE YOUR SURGERY PLEASE CALL [phone]     INSTRUCTIONS IMPORTANT!!!  ¨ Do not eat or drink after 12 midnight-including water, candy, gum, & mints. OK to brush teeth.      ____  Proceed to [facility] on *** for additional testing.        ____  Do not wear makeup, including mascara.  ____  No powder, lotions or creams to surgical area.  ____  Please remove all jewelry, including piercings and leave at home.  ____  No money or valuables needed. Please leave at home.  ____  Please bring any documents given by your doctor.  ____  If going home the same day, arrange for a ride home. You will not be able to             drive if Anesthesia was used.  ____  Children under 18 years require a parent / guardian present the entire time             they are in surgery / recovery.  ____  Wear loose fitting clothing. Allow for dressings, bandages.  ____  Stop Aspirin, Ibuprofen, Motrin, Aleve, Goody's/BC powders, Excedrine and Naproxen (NSAIDS) at least 3-5 days before surgery, unless otherwise instructed by your doctor, or the nurse.   You MAY use Tylenol/acetaminophen until day of surgery.  ____  Wash the surgical area with Hibiclens the night before surgery, and again the             morning of surgery.  Be sure to rinse hibiclens off completely (if instructed by   nurse).  ____  If you take diabetic medication, do not take am of surgery unless instructed by Doctor.  ____  Call MD for temperature above 101 degrees or any other signs of infection such as Urinary (bladder) infection, Upper respiratory infection, skin boils, etc.  ____ Stop taking any Fish Oil supplement or any Vitamins that contain Vitamin E at  least 5 days prior to surgery.  ____ Do Not wear your contact lenses the day of your procedure.  You may wear your glasses.      ____Do not shave surgical site for 3 days prior to surgery.  ____ Practice Good hand washing before, during, and after procedure.      I have read or had read and explained to me, and understand the above information.  Additional comments or instructions:  For additional questions call [phone]      ANESTHESIA SIDE EFFECTS  -For the first 24 hours after surgery:  Do not drive, use heavy equipment, make important decisions, or drink alcohol  -It is normal to feel sleepy for several hours.  Rest until you are more awake.  -Have someone stay with you, if needed.  They can watch for problems and help keep you safe.  -Some possible post anesthesia side effects include: nausea and vomiting, sore throat and hoarseness, sleepiness, and dizziness.        Pre-Op Bathing Instructions    Before surgery, you can play an important role in your own health.    Because skin is not sterile, we need to be sure that your skin is as free of germs as possible. By following the instructions below, you can reduce the number of germs on your skin before surgery.    IMPORTANT: You will need to shower with a special soap called Hibiclens*, available at any pharmacy.  If you are allergic to Chlorhexidine (the antiseptic in Hibiclens), use an antibacterial soap such as Dial Soap for your preoperative shower.  You will shower with Hibiclens both the night before your surgery and the morning of your surgery.  Do not use Hibiclens on the head, face or genitals to avoid injury to those areas.    STEP #1: THE NIGHT BEFORE YOUR SURGERY     Do not shave the area of your body where your surgery will be performed.  Shower and wash your hair and body as usual with your normal soap and shampoo.  Rinse your hair and body thoroughly after you shower to remove all soap residue.  With your hand, apply one packet of Hibiclens soap to  the surgical site.   Wash the site gently for five (5) minutes. Do not scrub your skin too hard.   Do not wash with your regular soap after Hibiclens is used.  Rinse your body thoroughly.  Pat yourself dry with a clean, soft towel.  Do not use lotion, cream, or powder.  Wear clean clothes.    STEP #2: THE MORNING OF YOUR SURGERY     Repeat Step #1.    * Not to be used by people allergic to Chlorhexidine.

## 2023-04-26 NOTE — ANESTHESIA PREPROCEDURE EVALUATION
"                                                                                                             04/26/2023     Diallo Castano is a 67 y.o., male scheduled for ARTHROPLASTY, KNEE, TOTAL monoblock (Right) on 5/15/23.    Per family medicine Dr. Villarreal, "Given cardiology has assess for pre-op optimization and is comfortable with surgery pt medically optimized for surgery at this time".  Followed by cardiology Dr. Ino Aguirre with last visit 2/22/23    Past Medical History:   Diagnosis Date    Arthritis      Past Surgical History:   Procedure Laterality Date    KNEE SURGERY       Current Outpatient Medications   Medication Instructions    amLODIPine (NORVASC) 2.5 MG tablet No dose, route, or frequency recorded.    lisinopriL-hydrochlorothiazide (PRINZIDE,ZESTORETIC) 20-12.5 mg per tablet No dose, route, or frequency recorded.    metoprolol succinate (TOPROL-XL) 100 MG 24 hr tablet No dose, route, or frequency recorded.    simvastatin (ZOCOR) 20 MG tablet No dose, route, or frequency recorded.       Pre-op Assessment    I have reviewed the Patient Summary Reports.     I have reviewed the Nursing Notes.    I have reviewed the Medications.     Review of Systems  Anesthesia Hx:  No problems with previous Anesthesia   Denies Personal Hx of Anesthesia complications.   Social:  Non-Smoker, Alcohol Use    Cardiovascular:   Exercise tolerance: good Hypertension (BP elevated during PAT visit due to pt not taking meds that morning and anxious), well controlled Valvular problems/Murmurs  Denies Angina. hyperlipidemia Bicuspid aortic valve   Pulmonary:  Pulmonary Normal  Denies Shortness of breath.    Renal/:  Renal/ Normal     Hepatic/GI:  Hepatic/GI Normal    Musculoskeletal:   Arthritis     Neurological:  Neurology Normal Denies TIA.  Denies CVA. Denies Seizures.    Endocrine:   Diabetes, well controlled, type 2 Denies Hypothyroidism. Denies Hyperthyroidism.  Morbid Obesity / BMI > " 40  Psych:  Psychiatric Normal           Physical Exam  General: Oriented    Airway:  Mallampati: IV / II  Mouth Opening: Normal  TM Distance: Normal  Tongue: Normal  Neck ROM: Normal ROM    Dental:  Intact    Chest/Lungs:  Clear to auscultation, Normal Respiratory Rate    Heart:  Rate: Normal  Rhythm: Regular Rhythm  Sounds: Normal        ejection fraction is 55 to 60%. No left ventricular wall motion abnormalities. Left ventricular diastolic function is mildly abnormal (Grade I).   Right Ventricle: The right ventricular size is normal. Normal TAPSE.   Left Atrium: The left atrium is normal in size, with a left atrial volume of 33.9 ml/mÂ². The left atrium is mildly dilated.   Right Atrium: The right atrium is normal in size. Estimated right atrial pressure is 3 mmHg.   Aortic Valve: The aortic valve is tricuspid. Moderate to severe aortic stenosis is present. The calculated aortic valve area is reduced. The peak velocity across the aortic valve is 3.69 m/s, which corresponds to a peak gradient of 44 mmHg and a mean gradient of 26 mmHg. Mild aortic valve regurgitation.   Mitral Valve: The mitral valve appears structurally normal. Mild mitral annular calcification. Mild mitral valve regurgitation.   Tricuspid Valve: The tricuspid valve appears normal in structure. Trace tricuspid regurgitation is present.   Pulmonic Valve: The pulmonic valve appears grossly normal though not all leaflets well visualized. Mild pulmonary valve regurgitation. No evidence of pulmonic stenosis.   Pulmonary Artery: The estimated systolic pulmonary artery pressure is 14 mmHg. which is normal.   Inferior Vena Cava: The inferior vena cava is normal in size and demonstrates normal respiratory variation. The IVC demonstrates greater than 50% decrease in size with respiration.   Pericardium: There is no pericardial effusion.   Other: There is no pleural effusion.     Strain: GLPS-Avg-15.4%     Summary:    1. Estimated left ventricular ejection  fraction is 55 to 60%.    2. Mildly dilated left atrium.    3. Mild aortic regurgitation.    4. Normal left ventricular systolic function.    5. LV diastolic function is mildly abnormal (Grade I).    6. Moderate to severe aortic valve stenosis.    7. Mild mitral valve regurgitation.     Lab Results   Component Value Date    WBC 7.55 03/30/2023    HGB 15.3 03/30/2023    HCT 45.3 03/30/2023     03/30/2023    ALT 41 03/30/2023    AST 22 03/30/2023     03/30/2023    K 4.4 03/30/2023     03/30/2023    CREATININE 1.0 03/30/2023    BUN 16 03/30/2023    CO2 25 03/30/2023     Results for orders placed or performed in visit on 04/26/23   EKG 12-lead    Collection Time: 04/26/23 12:50 PM    Narrative    Test Reason : M17.11,M25.561,G89.29,    Vent. Rate : 070 BPM     Atrial Rate : 070 BPM     P-R Int : 214 ms          QRS Dur : 104 ms      QT Int : 406 ms       P-R-T Axes : 041 -31 037 degrees     QTc Int : 438 ms    Sinus rhythm with 1st degree A-V block  Left axis deviation  Cannot rule out Anterior infarct (cited on or before 26-APR-2023)  Abnormal ECG  No prior ECG is available for comparison  Confirmed by Kj Gonzalez MD ([id number]) on 4/26/2023 6:27:45 PM    Referred By: CARLOTA MYRICK           Confirmed By:Kj Gonzalez MD       CXR 4/26/23  The cardiomediastinal silhouette is normal, midline.  The pulmonary vascularity is normal.   The pulmonary dianna appear normal.  The lungs are well expanded, clear, no focal airspace disease.  No pleural effusion or pneumothorax.  The osseous structures appear within normal limits.     Impression:  No acute intrathoracic process seen.      Anesthesia Plan  Type of Anesthesia, risks & benefits discussed:    Anesthesia Type: Spinal, Epidural  Intra-op Monitoring Plan: Standard ASA Monitors  Post Op Pain Control Plan: multimodal analgesia  Induction:  IV  Informed Consent: Informed consent signed with the Patient and all parties understand the risks and agree with  anesthesia plan.  All questions answered.   ASA Score: 3  Anesthesia Plan Notes:       Ready For Surgery From Anesthesia Perspective.     .

## 2023-05-11 ENCOUNTER — PROCEDURE VISIT (OUTPATIENT)
Dept: ORTHOPEDICS | Facility: CLINIC | Age: 68
End: 2023-05-11
Payer: MEDICARE

## 2023-05-11 VITALS
HEART RATE: 76 BPM | SYSTOLIC BLOOD PRESSURE: 143 MMHG | DIASTOLIC BLOOD PRESSURE: 87 MMHG | BODY MASS INDEX: 34.03 KG/M2 | HEIGHT: 69 IN | WEIGHT: 229.75 LBS

## 2023-05-11 DIAGNOSIS — G89.29 CHRONIC PAIN OF RIGHT KNEE: ICD-10-CM

## 2023-05-11 DIAGNOSIS — M25.561 CHRONIC PAIN OF RIGHT KNEE: ICD-10-CM

## 2023-05-11 DIAGNOSIS — M17.11 PRIMARY OSTEOARTHRITIS OF RIGHT KNEE: ICD-10-CM

## 2023-05-11 PROCEDURE — 64640 INJECTION TREATMENT OF NERVE: CPT | Mod: S$PBB,RT,, | Performed by: ORTHOPAEDIC SURGERY

## 2023-05-11 PROCEDURE — 64640 INJECTION TREATMENT OF NERVE: CPT | Mod: PBBFAC,PN | Performed by: ORTHOPAEDIC SURGERY

## 2023-05-11 PROCEDURE — 64640 PR DESTRUCT BY NEURO AGENT; OTHER PERIPH NERVE: ICD-10-PCS | Mod: S$PBB,RT,, | Performed by: ORTHOPAEDIC SURGERY

## 2023-05-11 PROCEDURE — 99499 UNLISTED E&M SERVICE: CPT | Mod: S$PBB,,, | Performed by: ORTHOPAEDIC SURGERY

## 2023-05-11 PROCEDURE — 99499 NO LOS: ICD-10-PCS | Mod: S$PBB,,, | Performed by: ORTHOPAEDIC SURGERY

## 2023-05-11 NOTE — PROCEDURES
Procedures  Procedure Note Iovera:    DATE OF PROCEDURE:  05/11/2023    PREOPERATIVE DIAGNOSIS: right knee pain.     POSTOPERATIVE DIAGNOSIS: right knee pain.     PROCEDURE: Iovera treatment of anterior femoral cutaneous nerve, Lateral femoral cut nerve, and both branches of infrapatellar saphenous nerve using at least 4 different punctures to treat all 4 nerves. (cpt 64640 x4)    ATTENDING SURGEON: Lowell Woodson M.D.   ASSISTANT: vinay paul    COMPLICATIONS: None.     IMPLANTS:  None    ESTIMATED BLOOD LOSS:  < 5cc    SPECIMENS REMOVED:  None    ANESTHESIA: Local lidocaine    INDICATIONS FOR PROCEDURE: This is a 67 y.o. male with longstanding knee pain. They have failed non operative management including injections.I discussed a new treatment therapy called Iovera, which is cryotherapy, to provide symptomatic relief along the sensory distribution of the infrapatellar tendon branch of the saphenous nerve  and AFCN. The patient elected to move forward with this  We did discuss the fact that this is a fairly novel procedure and there is very limited scientific data around this.  However, it FDA approved.  The patient was given patient information and literature to review prior to the procedure as well.  Based on this, the patient agreed to move forward with doing the procedure.      PROCEDURE:    The patient was placed supine on the exam table and the proximal medial aspect of the right tibia and anterior aspect of distal femur was prepped with sterile Betadine and alcohol.  A line was drawn extending approximately 5 cm medial to inferior pole of the patella distally to a point approximately 5 cm medial to the tibial tubercle.  A second line was drawn in a medial to lateral direction the width of the patella approximately 7 cm proximal to the patella. We then infiltrated the skin with lidocaine along both lines using a 25g needle. We then introduced the Iovera device along these lines and this device penetrated the skin,  creating cryotherapy to both branches of the infrapatellar saphenous nerve, a third treatment to the anterior femoral cutaneous nerve, and fourth LFCN. 7 punctures of the skin were made to treat the 2 branches of the ISN and another 7 punctures were made to treat the AFCN and LFCN. There were a total of 4 nerves treated with iovera. The patient tolerated the procedure well with no problems.

## 2023-05-15 ENCOUNTER — ANESTHESIA (OUTPATIENT)
Dept: SURGERY | Facility: HOSPITAL | Age: 68
End: 2023-05-15
Payer: MEDICARE

## 2023-05-15 ENCOUNTER — HOSPITAL ENCOUNTER (OUTPATIENT)
Facility: HOSPITAL | Age: 68
Discharge: HOME | End: 2023-05-15
Attending: ORTHOPAEDIC SURGERY | Admitting: ORTHOPAEDIC SURGERY
Payer: MEDICARE

## 2023-05-15 VITALS
TEMPERATURE: 98 F | RESPIRATION RATE: 20 BRPM | OXYGEN SATURATION: 96 % | BODY MASS INDEX: 41.47 KG/M2 | WEIGHT: 280 LBS | DIASTOLIC BLOOD PRESSURE: 65 MMHG | SYSTOLIC BLOOD PRESSURE: 136 MMHG | HEART RATE: 65 BPM | HEIGHT: 69 IN

## 2023-05-15 DIAGNOSIS — M17.12 PRIMARY OSTEOARTHRITIS OF LEFT KNEE: Primary | ICD-10-CM

## 2023-05-15 DIAGNOSIS — M17.11 PRIMARY OSTEOARTHRITIS OF RIGHT KNEE: ICD-10-CM

## 2023-05-15 LAB
APPEARANCE FLD: CLEAR
BODY FLD TYPE: NORMAL
COLOR FLD: NORMAL
EOSINOPHIL NFR FLD MANUAL: 1 %
LYMPHOCYTES NFR FLD MANUAL: 42 %
MESOTHL CELL NFR FLD MANUAL: 21 %
MONOS+MACROS NFR FLD MANUAL: 31 %
NEUTROPHILS NFR FLD MANUAL: 5 %
WBC # FLD: 179 /CU MM

## 2023-05-15 PROCEDURE — A6011 COLLAGEN GEL/PASTE WOUND FIL: HCPCS | Performed by: ORTHOPAEDIC SURGERY

## 2023-05-15 PROCEDURE — A4247 BETADINE/IODINE SWABS/WIPES: HCPCS

## 2023-05-15 PROCEDURE — 27447 TOTAL KNEE ARTHROPLASTY: CPT | Mod: 58,RT,GC, | Performed by: ORTHOPAEDIC SURGERY

## 2023-05-15 PROCEDURE — 27201121 HC TRAY,SPINAL-HYPERBARIC: Performed by: ANESTHESIOLOGY

## 2023-05-15 PROCEDURE — 63600175 PHARM REV CODE 636 W HCPCS: Performed by: NURSE ANESTHETIST, CERTIFIED REGISTERED

## 2023-05-15 PROCEDURE — 97165 OT EVAL LOW COMPLEX 30 MIN: CPT

## 2023-05-15 PROCEDURE — 25000003 PHARM REV CODE 250: Performed by: NURSE ANESTHETIST, CERTIFIED REGISTERED

## 2023-05-15 PROCEDURE — 27447 PR TOTAL KNEE ARTHROPLASTY: ICD-10-PCS | Mod: 58,RT,GC, | Performed by: ORTHOPAEDIC SURGERY

## 2023-05-15 PROCEDURE — 97161 PT EVAL LOW COMPLEX 20 MIN: CPT

## 2023-05-15 PROCEDURE — 37000008 HC ANESTHESIA 1ST 15 MINUTES: Performed by: ORTHOPAEDIC SURGERY

## 2023-05-15 PROCEDURE — C1713 ANCHOR/SCREW BN/BN,TIS/BN: HCPCS | Performed by: ORTHOPAEDIC SURGERY

## 2023-05-15 PROCEDURE — C1776 JOINT DEVICE (IMPLANTABLE): HCPCS | Performed by: ORTHOPAEDIC SURGERY

## 2023-05-15 PROCEDURE — 27200671 HC STIMUCATH NEEDLE/ CATHETER: Performed by: ANESTHESIOLOGY

## 2023-05-15 PROCEDURE — D9220A PRA ANESTHESIA: Mod: CRNA,,, | Performed by: NURSE ANESTHETIST, CERTIFIED REGISTERED

## 2023-05-15 PROCEDURE — 0055T PR COMPUTER-ASSIST MUSCSKEL NAVIG, ORTHO PROC, CT/MRI: ICD-10-PCS | Mod: GC,,, | Performed by: ORTHOPAEDIC SURGERY

## 2023-05-15 PROCEDURE — 63600175 PHARM REV CODE 636 W HCPCS: Performed by: NURSE PRACTITIONER

## 2023-05-15 PROCEDURE — 25000003 PHARM REV CODE 250

## 2023-05-15 PROCEDURE — D9220A PRA ANESTHESIA: ICD-10-PCS | Mod: CRNA,,, | Performed by: NURSE ANESTHETIST, CERTIFIED REGISTERED

## 2023-05-15 PROCEDURE — D9220A PRA ANESTHESIA: Mod: ANES,,, | Performed by: ANESTHESIOLOGY

## 2023-05-15 PROCEDURE — 64447 NJX AA&/STRD FEMORAL NRV IMG: CPT | Performed by: ANESTHESIOLOGY

## 2023-05-15 PROCEDURE — 63600175 PHARM REV CODE 636 W HCPCS: Performed by: STUDENT IN AN ORGANIZED HEALTH CARE EDUCATION/TRAINING PROGRAM

## 2023-05-15 PROCEDURE — 71000016 HC POSTOP RECOV ADDL HR: Performed by: ORTHOPAEDIC SURGERY

## 2023-05-15 PROCEDURE — 71000015 HC POSTOP RECOV 1ST HR: Performed by: ORTHOPAEDIC SURGERY

## 2023-05-15 PROCEDURE — 89051 BODY FLUID CELL COUNT: CPT | Performed by: ORTHOPAEDIC SURGERY

## 2023-05-15 PROCEDURE — 25000003 PHARM REV CODE 250: Performed by: STUDENT IN AN ORGANIZED HEALTH CARE EDUCATION/TRAINING PROGRAM

## 2023-05-15 PROCEDURE — 36000711: Performed by: ORTHOPAEDIC SURGERY

## 2023-05-15 PROCEDURE — 27201423 OPTIME MED/SURG SUP & DEVICES STERILE SUPPLY: Performed by: ORTHOPAEDIC SURGERY

## 2023-05-15 PROCEDURE — 64447 PERIPHERAL BLOCK: ICD-10-PCS | Mod: 59,RT,, | Performed by: ANESTHESIOLOGY

## 2023-05-15 PROCEDURE — 63600175 PHARM REV CODE 636 W HCPCS: Performed by: ANESTHESIOLOGY

## 2023-05-15 PROCEDURE — 37000009 HC ANESTHESIA EA ADD 15 MINS: Performed by: ORTHOPAEDIC SURGERY

## 2023-05-15 PROCEDURE — D9220A PRA ANESTHESIA: ICD-10-PCS | Mod: ANES,,, | Performed by: ANESTHESIOLOGY

## 2023-05-15 PROCEDURE — 25000003 PHARM REV CODE 250: Performed by: ORTHOPAEDIC SURGERY

## 2023-05-15 PROCEDURE — 97535 SELF CARE MNGMENT TRAINING: CPT

## 2023-05-15 PROCEDURE — 71000033 HC RECOVERY, INTIAL HOUR: Performed by: ORTHOPAEDIC SURGERY

## 2023-05-15 PROCEDURE — 36000710: Performed by: ORTHOPAEDIC SURGERY

## 2023-05-15 PROCEDURE — 0055T BONE SRGRY CMPTR CT/MRI IMAG: CPT | Mod: GC,,, | Performed by: ORTHOPAEDIC SURGERY

## 2023-05-15 DEVICE — PIN PINABALL HEADLESS: Type: IMPLANTABLE DEVICE | Site: KNEE | Status: FUNCTIONAL

## 2023-05-15 DEVICE — IMPLANTABLE DEVICE: Type: IMPLANTABLE DEVICE | Site: KNEE | Status: FUNCTIONAL

## 2023-05-15 RX ORDER — CEFAZOLIN SODIUM 2 G/50ML
2 SOLUTION INTRAVENOUS ONCE
Status: COMPLETED | OUTPATIENT
Start: 2023-05-15 | End: 2023-05-15

## 2023-05-15 RX ORDER — TRANEXAMIC ACID 650 MG/1
1950 TABLET ORAL ONCE
Status: COMPLETED | OUTPATIENT
Start: 2023-05-15 | End: 2023-05-15

## 2023-05-15 RX ORDER — ACETAMINOPHEN 325 MG/1
650 TABLET ORAL EVERY 4 HOURS PRN
Status: CANCELLED | OUTPATIENT
Start: 2023-05-15

## 2023-05-15 RX ORDER — CELECOXIB 100 MG/1
400 CAPSULE ORAL ONCE
Status: COMPLETED | OUTPATIENT
Start: 2023-05-15 | End: 2023-05-15

## 2023-05-15 RX ORDER — PHENYLEPHRINE HYDROCHLORIDE 10 MG/ML
INJECTION INTRAVENOUS
Status: DISCONTINUED | OUTPATIENT
Start: 2023-05-15 | End: 2023-05-15

## 2023-05-15 RX ORDER — FAMOTIDINE 20 MG/1
20 TABLET, FILM COATED ORAL 2 TIMES DAILY
Status: CANCELLED | OUTPATIENT
Start: 2023-05-15

## 2023-05-15 RX ORDER — PROPOFOL 10 MG/ML
VIAL (ML) INTRAVENOUS CONTINUOUS PRN
Status: DISCONTINUED | OUTPATIENT
Start: 2023-05-15 | End: 2023-05-15

## 2023-05-15 RX ORDER — ONDANSETRON 2 MG/ML
INJECTION INTRAMUSCULAR; INTRAVENOUS
Status: DISCONTINUED | OUTPATIENT
Start: 2023-05-15 | End: 2023-05-15

## 2023-05-15 RX ORDER — MUPIROCIN 20 MG/G
1 OINTMENT TOPICAL 2 TIMES DAILY
Status: CANCELLED | OUTPATIENT
Start: 2023-05-15 | End: 2023-05-20

## 2023-05-15 RX ORDER — PROPOFOL 10 MG/ML
VIAL (ML) INTRAVENOUS
Status: DISCONTINUED | OUTPATIENT
Start: 2023-05-15 | End: 2023-05-15

## 2023-05-15 RX ORDER — CEFAZOLIN SODIUM 2 G/50ML
2 SOLUTION INTRAVENOUS
Status: CANCELLED | OUTPATIENT
Start: 2023-05-15 | End: 2023-05-16

## 2023-05-15 RX ORDER — LOPERAMIDE HYDROCHLORIDE 2 MG/1
4 CAPSULE ORAL ONCE AS NEEDED
Status: CANCELLED | OUTPATIENT
Start: 2023-05-15 | End: 2034-10-11

## 2023-05-15 RX ORDER — LIDOCAINE HYDROCHLORIDE 10 MG/ML
1 INJECTION, SOLUTION EPIDURAL; INFILTRATION; INTRACAUDAL; PERINEURAL ONCE
Status: DISCONTINUED | OUTPATIENT
Start: 2023-05-15 | End: 2023-05-15 | Stop reason: HOSPADM

## 2023-05-15 RX ORDER — NAPROXEN SODIUM 220 MG/1
81 TABLET, FILM COATED ORAL 2 TIMES DAILY
Status: DISCONTINUED | OUTPATIENT
Start: 2023-05-15 | End: 2023-05-15 | Stop reason: HOSPADM

## 2023-05-15 RX ORDER — ASPIRIN 81 MG/1
81 TABLET ORAL 2 TIMES DAILY
Qty: 84 TABLET | Refills: 0 | Status: SHIPPED | OUTPATIENT
Start: 2023-05-15 | End: 2023-06-26

## 2023-05-15 RX ORDER — DICLOFENAC SODIUM 75 MG/1
75 TABLET, DELAYED RELEASE ORAL 2 TIMES DAILY
Qty: 84 TABLET | Refills: 0 | Status: SHIPPED | OUTPATIENT
Start: 2023-05-15 | End: 2023-06-26

## 2023-05-15 RX ORDER — ACETAMINOPHEN 325 MG/1
325 TABLET ORAL EVERY 4 HOURS
Qty: 84 TABLET | Refills: 0 | Status: SHIPPED | OUTPATIENT
Start: 2023-05-15 | End: 2023-05-29

## 2023-05-15 RX ORDER — FAMOTIDINE 20 MG/1
20 TABLET, FILM COATED ORAL 2 TIMES DAILY PRN
Qty: 84 TABLET | Refills: 0 | Status: SHIPPED | OUTPATIENT
Start: 2023-05-15 | End: 2023-06-26

## 2023-05-15 RX ORDER — DEXAMETHASONE SODIUM PHOSPHATE 100 MG/10ML
10 INJECTION INTRAMUSCULAR; INTRAVENOUS ONCE
Status: COMPLETED | OUTPATIENT
Start: 2023-05-15 | End: 2023-05-15

## 2023-05-15 RX ORDER — CEPHALEXIN 500 MG/1
500 CAPSULE ORAL EVERY 6 HOURS
Qty: 4 CAPSULE | Refills: 0 | Status: SHIPPED | OUTPATIENT
Start: 2023-05-15 | End: 2023-05-16

## 2023-05-15 RX ORDER — HYDROMORPHONE HYDROCHLORIDE 2 MG/ML
0.5 INJECTION, SOLUTION INTRAMUSCULAR; INTRAVENOUS; SUBCUTANEOUS EVERY 5 MIN PRN
Status: DISCONTINUED | OUTPATIENT
Start: 2023-05-15 | End: 2023-05-15 | Stop reason: HOSPADM

## 2023-05-15 RX ORDER — OXYCODONE HYDROCHLORIDE 5 MG/1
5 TABLET ORAL
Status: DISCONTINUED | OUTPATIENT
Start: 2023-05-15 | End: 2023-05-15 | Stop reason: HOSPADM

## 2023-05-15 RX ORDER — CELECOXIB 100 MG/1
200 CAPSULE ORAL 2 TIMES DAILY
Status: CANCELLED | OUTPATIENT
Start: 2023-05-15

## 2023-05-15 RX ORDER — MIDAZOLAM HYDROCHLORIDE 1 MG/ML
INJECTION INTRAMUSCULAR; INTRAVENOUS
Status: DISCONTINUED | OUTPATIENT
Start: 2023-05-15 | End: 2023-05-15

## 2023-05-15 RX ORDER — SODIUM CHLORIDE, SODIUM LACTATE, POTASSIUM CHLORIDE, CALCIUM CHLORIDE 600; 310; 30; 20 MG/100ML; MG/100ML; MG/100ML; MG/100ML
INJECTION, SOLUTION INTRAVENOUS CONTINUOUS
Status: DISCONTINUED | OUTPATIENT
Start: 2023-05-15 | End: 2023-05-15 | Stop reason: HOSPADM

## 2023-05-15 RX ORDER — PREGABALIN 75 MG/1
150 CAPSULE ORAL ONCE
Status: COMPLETED | OUTPATIENT
Start: 2023-05-15 | End: 2023-05-15

## 2023-05-15 RX ORDER — PREGABALIN 75 MG/1
75 CAPSULE ORAL 2 TIMES DAILY
Status: CANCELLED | OUTPATIENT
Start: 2023-05-15

## 2023-05-15 RX ORDER — ONDANSETRON 2 MG/ML
4 INJECTION INTRAMUSCULAR; INTRAVENOUS DAILY PRN
Status: DISCONTINUED | OUTPATIENT
Start: 2023-05-15 | End: 2023-05-15 | Stop reason: HOSPADM

## 2023-05-15 RX ORDER — BUPIVACAINE HYDROCHLORIDE 2.5 MG/ML
INJECTION, SOLUTION INFILTRATION; PERINEURAL
Status: DISCONTINUED | OUTPATIENT
Start: 2023-05-15 | End: 2023-05-15 | Stop reason: HOSPADM

## 2023-05-15 RX ORDER — POLYETHYLENE GLYCOL 3350 17 G/17G
17 POWDER, FOR SOLUTION ORAL DAILY
Status: CANCELLED | OUTPATIENT
Start: 2023-05-15

## 2023-05-15 RX ORDER — TRANEXAMIC ACID 100 MG/ML
INJECTION, SOLUTION INTRAVENOUS
Status: DISCONTINUED | OUTPATIENT
Start: 2023-05-15 | End: 2023-05-15 | Stop reason: HOSPADM

## 2023-05-15 RX ORDER — DEXTROMETHORPHAN POLISTIREX 30 MG/5 ML
1 SUSPENSION, EXTENDED RELEASE 12 HR ORAL EVERY 12 HOURS PRN
Status: CANCELLED | OUTPATIENT
Start: 2023-05-15

## 2023-05-15 RX ORDER — ONDANSETRON 2 MG/ML
4 INJECTION INTRAMUSCULAR; INTRAVENOUS EVERY 12 HOURS PRN
Status: CANCELLED | OUTPATIENT
Start: 2023-05-15

## 2023-05-15 RX ORDER — SODIUM CHLORIDE 0.9 % (FLUSH) 0.9 %
10 SYRINGE (ML) INJECTION
Status: DISCONTINUED | OUTPATIENT
Start: 2023-05-15 | End: 2023-05-15 | Stop reason: HOSPADM

## 2023-05-15 RX ORDER — POVIDONE-IODINE 10 %
SOLUTION, NON-ORAL TOPICAL
Status: DISCONTINUED | OUTPATIENT
Start: 2023-05-15 | End: 2023-05-15 | Stop reason: HOSPADM

## 2023-05-15 RX ORDER — LIDOCAINE HYDROCHLORIDE 20 MG/ML
INJECTION INTRAVENOUS
Status: DISCONTINUED | OUTPATIENT
Start: 2023-05-15 | End: 2023-05-15

## 2023-05-15 RX ORDER — BISACODYL 10 MG
10 SUPPOSITORY, RECTAL RECTAL EVERY 12 HOURS PRN
Status: CANCELLED | OUTPATIENT
Start: 2023-05-15

## 2023-05-15 RX ORDER — DULOXETIN HYDROCHLORIDE 30 MG/1
60 CAPSULE, DELAYED RELEASE ORAL ONCE
Status: COMPLETED | OUTPATIENT
Start: 2023-05-15 | End: 2023-05-15

## 2023-05-15 RX ORDER — ACETAMINOPHEN 500 MG
1000 TABLET ORAL ONCE
Status: COMPLETED | OUTPATIENT
Start: 2023-05-15 | End: 2023-05-15

## 2023-05-15 RX ADMIN — ONDANSETRON 8 MG: 2 INJECTION, SOLUTION INTRAMUSCULAR; INTRAVENOUS at 09:05

## 2023-05-15 RX ADMIN — TRANEXAMIC ACID 1000 MG: 100 INJECTION, SOLUTION INTRAVENOUS at 08:05

## 2023-05-15 RX ADMIN — CELECOXIB 400 MG: 100 CAPSULE ORAL at 06:05

## 2023-05-15 RX ADMIN — CEFAZOLIN SODIUM 2 G: 2 SOLUTION INTRAVENOUS at 07:05

## 2023-05-15 RX ADMIN — SODIUM CHLORIDE, SODIUM LACTATE, POTASSIUM CHLORIDE, AND CALCIUM CHLORIDE: .6; .31; .03; .02 INJECTION, SOLUTION INTRAVENOUS at 08:05

## 2023-05-15 RX ADMIN — PROPOFOL 100 MCG/KG/MIN: 10 INJECTION, EMULSION INTRAVENOUS at 07:05

## 2023-05-15 RX ADMIN — MIDAZOLAM HYDROCHLORIDE 2 MG: 1 INJECTION, SOLUTION INTRAMUSCULAR; INTRAVENOUS at 07:05

## 2023-05-15 RX ADMIN — PHENYLEPHRINE HYDROCHLORIDE 100 MCG: 10 INJECTION INTRAVENOUS at 07:05

## 2023-05-15 RX ADMIN — ACETAMINOPHEN 1000 MG: 500 TABLET ORAL at 06:05

## 2023-05-15 RX ADMIN — HYDROMORPHONE HYDROCHLORIDE 0.5 MG: 2 INJECTION, SOLUTION INTRAMUSCULAR; INTRAVENOUS; SUBCUTANEOUS at 09:05

## 2023-05-15 RX ADMIN — DULOXETINE 60 MG: 30 CAPSULE, DELAYED RELEASE ORAL at 06:05

## 2023-05-15 RX ADMIN — LIDOCAINE HYDROCHLORIDE 75 MG: 20 INJECTION, SOLUTION INTRAVENOUS at 07:05

## 2023-05-15 RX ADMIN — PREGABALIN 150 MG: 75 CAPSULE ORAL at 06:05

## 2023-05-15 RX ADMIN — PHENYLEPHRINE HYDROCHLORIDE 100 MCG: 10 INJECTION INTRAVENOUS at 08:05

## 2023-05-15 RX ADMIN — TRANEXAMIC ACID 1950 MG: 650 TABLET ORAL at 06:05

## 2023-05-15 RX ADMIN — CEFAZOLIN SODIUM 1 G: 2 SOLUTION INTRAVENOUS at 07:05

## 2023-05-15 RX ADMIN — PROPOFOL 30 MG: 10 INJECTION, EMULSION INTRAVENOUS at 07:05

## 2023-05-15 RX ADMIN — DEXAMETHASONE SODIUM PHOSPHATE 10 MG: 10 INJECTION INTRAMUSCULAR; INTRAVENOUS at 07:05

## 2023-05-15 RX ADMIN — PHENYLEPHRINE HYDROCHLORIDE 0.3 MCG/KG/MIN: 10 INJECTION INTRAVENOUS at 07:05

## 2023-05-15 RX ADMIN — SODIUM CHLORIDE, SODIUM LACTATE, POTASSIUM CHLORIDE, AND CALCIUM CHLORIDE: .6; .31; .03; .02 INJECTION, SOLUTION INTRAVENOUS at 07:05

## 2023-05-15 NOTE — PT/OT/SLP EVAL
Physical Therapy Evaluation and Discharge Note    Patient Name:  Diallo Castano   MRN:  [id number]    Recommendations:     Discharge Recommendations: outpatient PT  Discharge Equipment Recommendations: none   Barriers to discharge: None    Assessment:     Diallo Castano is a 67 y.o. male admitted with a medical diagnosis of R TKA.  Educated pt and pt's wife on knee positioning with elevation to promote knee extension, use of ice pack for pain management, gait training with RW, car transfers, step negotiation, and LE exercises.  Completed RW fitting. Pt ambulated 50 ft with RW and SBA this date. Plan is for pt to d/c home today with OP PT to begin tomorrow's date.    Recent Surgery: Procedure(s) (LRB):  ARTHROPLASTY, KNEE, TOTAL monoblock (Right) Day of Surgery    Plan:     During this hospitalization, patient does not require further acute PT services.  Please re-consult if situation changes.      Subjective     Chief Complaint: none  Patient/Family Comments/goals: pt reporting no concerns regarding d/c  Pain/Comfort:  Pain Rating 1: 4/10  Location - Side 1: Right  Location 1: knee  Pain Addressed 1: Reposition, Distraction, Cessation of Activity, Nurse notified  Pain Rating Post-Intervention 1:  (not rated)    Patients cultural, spiritual, Yazidi conflicts given the current situation: no    Living Environment:  Pt lives with his wife in a H with 4-5 ANTIONETTE with B rails and walk in shower.  Prior to admission, patients level of function was independent without AD for mobility and ADLs, drives.  Equipment used at home: cane, straight, walker, rolling.  DME owned (not currently used): rolling walker and single point cane. Upon discharge, patient will have assistance from his wife.    Objective:     Communicated with nurse Ochoa prior to session. Patient found HOB elevated with bed alarm upon PT entry to room.    General Precautions: Standard, fall    Orthopedic Precautions:N/A   Braces: N/A  Respiratory  "Status: Room air    Exams:  Postural Exam:  Patient presented with the following abnormalities:    -       Rounded shoulders  Sensation:    -       Intact  Skin Integrity/Edema:      -       Skin integrity: surgical incision to R knee with dressing in place  RLE ROM: WFL except knee AROM ~10-80 deg  RLE Strength: ankle DF WFL, resistance not applied to knee/hip  LLE ROM: WFL  LLE Strength: WFL    Functional Mobility:  Bed Mobility:     Scooting: supervision  Supine to Sit: supervision  Sit to Supine: supervision  Transfers:     Sit to Stand:  contact guard assistance with rolling walker  Toilet Transfer: stand by assistance with  rolling walker  using  Step Transfer  Gait: 50 ft with RW and CGA initially then progressing to SBA - educated on 3-point gait pattern. No instability or LOB noted, cues to maintains LEs within boundary of RW specifically during turning.  Stairs: 8" step with RW and CGA    AM-PAC 6 CLICK MOBILITY  Total Score:18       Treatment and Education:  Educated on role of PT and pt agreeable to participate in therapy session.  Educated on RLE positioning with elevation to promote knee extension and use of ice pack.  Pt brought family member's RW and PT fit it to pt's height.  Educated on hand placement prior to standing/sitting.  Ambulated as above then ambulated to restroom, unable to urinate at this time.  Negotiated step then returned to sit EOB.  OT educated pt on lower body dressing.  Educated on car transfers and LE exercises.    AM-PAC 6 CLICK MOBILITY  Total Score:18     Patient left HOB elevated with all lines intact, call button in reach, nurse notified, and pt's wife present.    GOALS:   Multidisciplinary Problems       Physical Therapy Goals       Not on file              Multidisciplinary Problems (Resolved)          Problem: Physical Therapy    Goal Priority Disciplines Outcome Goal Variances Interventions   Physical Therapy Goal   (Resolved)     PT, PT/OT Met                   "       History:     Past Medical History:   Diagnosis Date    Arthritis        Past Surgical History:   Procedure Laterality Date    KNEE SURGERY         Time Tracking:     PT Received On: 05/15/23  PT Start Time: 1129     PT Stop Time: 1152  PT Total Time (min): 23 min with OT    Billable Minutes: Evaluation 10      05/15/2023

## 2023-05-15 NOTE — PT/OT/SLP EVAL
"Occupational Therapy   Evaluation/Treatment/Dc Summary     Name: Diallo Castano  MRN: [id number]  Admitting Diagnosis: <principal problem not specified>  Recent Surgery: Procedure(s) (LRB):  ARTHROPLASTY, KNEE, TOTAL monoblock (Right) Day of Surgery    Recommendations:     Discharge Recommendations: outpatient PT  Discharge Equipment Recommendations:  none  Barriers to discharge:  None    Assessment:     Diallo Castano is a 67 y.o. male with a medical diagnosis of <principal problem not specified>.  He presents with The primary encounter diagnosis was Primary osteoarthritis of left knee. A diagnosis of Primary osteoarthritis of right knee was also pertinent to this visit.  . Performance deficits affecting function: impaired functional mobility, impaired sensation, decreased lower extremity function.      Pt participating in initial OT/PT evaluations this date. Pt educated on adaptive dsg post surgery, home safety, car/toilet/shower/tub t/fs, use of DME for functional mobility and ADLs. Pt to attend OP PT tomorrow's date. D/c OT     Rehab Prognosis: Good; patient would benefit from acute skilled OT services to address these deficits and reach maximum level of function.       Plan:     Patient to be seen  (d/c OT) to address the above listed problems via    Plan of Care Expires: 05/15/23  Plan of Care Reviewed with: patient, spouse    Subjective     Chief Complaint: "dull pain"   Patient/Family Comments/goals: return to (I) PLOF     Occupational Profile:  Living Environment: with spouse in [facility], 4-5 steps to enter, B rails unable to reach at same time, WIS   Previous level of function: independent; did not use DME; drives ; retired   Equipment Used at Home: shower chair (access to RW)  Assistance upon Discharge: from spouse     Pain/Comfort:  Pain Rating 1: 4/10  Location - Side 1: Right  Location - Orientation 1: generalized  Location 1: knee  Pain Addressed 1: Reposition, Distraction, Cessation of Activity, " Nurse notified  Pain Rating Post-Intervention 1:  (did not rate)    Patients cultural, spiritual, Lutheran conflicts given the current situation:      Objective:     Communicated with: nsg prior to session.  Patient found supine with   upon OT entry to room.    General Precautions: Standard, fall  Orthopedic Precautions: N/A  Braces: N/A  Respiratory Status: Room air    Occupational Performance:    Bed Mobility:    Patient completed Scooting/Bridging with supervision  Patient completed Supine to Sit with supervision  Patient completed Sit to Supine with supervision    Functional Mobility/Transfers:  Patient completed Sit <> Stand Transfer with contact guard assistance  with  rolling walker   Patient completed Toilet Transfer Step Transfer technique with contact guard assistance with  rolling walker  Functional Mobility: CGA- SBA with RW; cues for 3 pt gait, proximity to RW, posture     Activities of Daily Living:  Lower Body Dressing: contact guard assistance alli underwear and pants   Toileting: contact guard assistance standing to urinate     Cognitive/Visual Perceptual:  WFL     Physical Exam:  Balance:    -       WFL seated; fair/fair + standing   BUE ROM/MMT WFL   Surgical edema and pain     AMPA 6 Click ADL:  AMPAC Total Score: 21    Treatment & Education:  .Pt performing skills as listed above  Pt re-educated on impt of elevating RLE with correct placement of prop to encourage knee ext  Educated on use of ice pack/restricition/precautions   Therapist demonstrating car t/f and safety   Adjustment of RW   Functional mobility performed in hallway with adaptive sequence following therapist demonstration   Educated on home safety with showers and/or shower t/f; educated on placement/use of DME (shower chair, RW, BSC, etc)   Adaptive LBD performed while seated EOB following therapist demonstration  Pt stood at toilet to attempt to urinate but not successful   Participated in stair/step navigation/trng        Patient left supine with all lines intact, call button in reach, nsg notified, and spouse  present    GOALS:   Multidisciplinary Problems       Occupational Therapy Goals          Problem: Occupational Therapy    Goal Priority Disciplines Outcome Interventions   Occupational Therapy Goal     OT, PT/OT Adequate for Care Transition                        History:     Past Medical History:   Diagnosis Date    Arthritis          Past Surgical History:   Procedure Laterality Date    KNEE SURGERY         Time Tracking:     OT Date of Treatment: 05/15/23  OT Start Time: 1129  OT Stop Time: 1153  OT Total Time (min): 24 min    Billable Minutes:Evaluation 10  Self Care/Home Management 14    5/15/2023

## 2023-05-15 NOTE — BRIEF OP NOTE
"[facility] - [facility] (Hospital)  Brief Operative Note    Surgery Date: 5/15/2023     Surgeon(s) and Role:     * Lowell Woodson MD - Primary    Assisting Surgeon: None    Pre-op Diagnosis:  Primary osteoarthritis of right knee [M17.11]  Chronic pain of right knee [M25.561, G89.29]    Post-op Diagnosis:  Post-Op Diagnosis Codes:     * Primary osteoarthritis of right knee [M17.11]     * Chronic pain of right knee [M25.561, G89.29]    Procedure(s) (LRB):  ARTHROPLASTY, KNEE, TOTAL monoblock (Right)    Anesthesia: Spinal/Epidural    Operative Findings: Right knee OA    Estimated Blood Loss: 400cc         Specimens:   Specimen (24h ago, onward)      None              Discharge Note    OUTCOME: Patient tolerated treatment/procedure well without complication and is now ready for discharge.    DISPOSITION: Home or Self Care    FINAL DIAGNOSIS:  <principal problem not specified>    FOLLOWUP: In clinic    DISCHARGE INSTRUCTIONS:    Discharge Procedure Orders   WALKER FOR HOME USE     Order Specific Question Answer Comments   Type of Walker: Adult (5'4"-6'6")    With wheels? Yes    Height: 5'9"    Weight: 282    Length of need (1-99 months): 99    Please check all that apply: Patient's condition impairs ambulation.        "

## 2023-05-15 NOTE — ANESTHESIA POSTPROCEDURE EVALUATION
Anesthesia Post Evaluation    Patient: Diallo Castano    Procedure(s) Performed: Procedure(s) (LRB):  ARTHROPLASTY, KNEE, TOTAL monoblock (Right)    Final Anesthesia Type: spinal      Patient location during evaluation: PACU  Patient participation: Yes- Able to Participate  Level of consciousness: awake and alert  Post-procedure vital signs: reviewed and stable  Pain management: adequate  Airway patency: patent    PONV status at discharge: No PONV  Anesthetic complications: no      Cardiovascular status: blood pressure returned to baseline  Respiratory status: unassisted  Hydration status: euvolemic            Vitals Value Taken Time   /65 05/15/23 1045   Temp 36.7 °C (98.1 °F) 05/15/23 1045   Pulse 65 05/15/23 1045   Resp 18 05/15/23 1045   SpO2 96 % 05/15/23 1045         Event Time   Out of Recovery 10:26:47         Pain/Vic Score: Pain Rating Prior to Med Admin: 0 (5/15/2023 10:45 AM)  Pain Rating Post Med Admin: 0 (5/15/2023 10:45 AM)  Vic Score: 10 (5/15/2023 10:45 AM)

## 2023-05-15 NOTE — TRANSFER OF CARE
"Anesthesia Transfer of Care Note    Patient: Diallo Castano    Procedure(s) Performed: Procedure(s) (LRB):  ARTHROPLASTY, KNEE, TOTAL monoblock (Right)    Patient location: PACU    Anesthesia Type: spinal    Transport from OR: Transported from OR on 6-10 L/min O2 by face mask with adequate spontaneous ventilation    Post pain: adequate analgesia    Post assessment: no apparent anesthetic complications    Post vital signs: stable    Level of consciousness: awake and alert    Nausea/Vomiting: no nausea/vomiting    Complications: none    Transfer of care protocol was followed      Last vitals:   Visit Vitals  /67 (BP Location: Right arm, Patient Position: Lying)   Pulse 61   Temp 37.2 °C (99 °F) (Skin)   Resp 18   Ht 5' 9" (1.753 m)   Wt 127 kg (280 lb)   SpO2 98%   BMI 41.35 kg/m²     "

## 2023-05-15 NOTE — OP NOTE
Procedure Note Jessee Monoblock TKA:      DATE OF PROCEDURE:  5/15/2023    PREOPERATIVE DIAGNOSIS: right knee bone-on-bone osteoarthritis, morbid obesity.     POSTOPERATIVE DIAGNOSIS: right knee bone-on-bone osteoarthritis, morbid obesity.     PROCEDURE: Computer-assisted right total knee arthroplasty with unresurfaced patella.     ATTENDING SURGEON: Lowell Woodson M.D.   ASSISTANT:     Risk Adjustment: Please see media tab for any additional diagnoses faxed from my office related to theTKA.    COMPLICATIONS: None.     IMPLANTS:   1. Jessee NexGen trabecular metal monoblock tibial tray size 6 and 10 mm height .   2. Jessee Persona femoral component  Size 11  right.     INDICATIONS FOR PROCEDURE: This is a 67 y.o. male with longstanding knee pain. They have failed nonoperative management including injections. Risks and benefits of total knee arthroplasty were explained to the patient. The patient agreed to move forward with total knee arthroplasty. We also discussed the fact that no resurfacing the patella could lead to anterior knee pain requiring additional surgery in the future.     FINDINGS: The patient had a complete articular cartilage loss down to subchondral bone throughout the knee. The case was made difficult secondary to patient's body habitus. We had to increase our typical incision length. Mobilizing the patella was challenging because of the overlying soft tissues. When subluxing the tibia to expose the proximal tibia, the calf was impinging on the thigh making exposure difficult. we will extend the post op antibiotics and added cellerate/ prineo at the time of closing.      PROCEDURE IN DETAIL: The patient had adductor nerve block prior to entry to the OR. The patient was then brought to the Operating Room and spinal anesthesia started without any difficulties. The patient was placed supine on the operative table.  Knee was then prepped and draped in sterile fashion. Prior to  incision, proper site and procedure as well as antibiotic administration was verified. AFCN and ISN nerves were then injected with marcaine. Anterior midline incision was created overlying center patella proximally to the medial border of the tibial tubercle distally. Skin, subcutaneous fat and deep fascial layer were sharply incised until we came to extensor mechanism retinaculum. Flap was then elevated medially to VMO and laterally to lateral border of patella. Knee was then aspirated and synovial fluid sent for cell count. Medial parapatellar arthrotomy was injected with Marcaine and a medial parapatellar arthrotomy was then created. Synovium overlying the anterolateral distal femur was then excised as well as the infrapatellar tendon fat pad. Sleeve of soft tissue was elevated off the proximal medial tibia. Periphery of the patella was denvervated using bovie cautery, Hohmann retractors then placed medially and laterally along the distal femur to protect the collateral ligaments. ACL and anterior horn of lateral meniscus were then transected. We then pinned our navigation tracker on to distal femur and then performed our anatomy survey for the femur. We placed distal femoral cutting guide such that we were perpendicular to mechanical axis, aiming for about 1 degree of flexion and about 9 mm of bony resection. Distal femur was then resected. Next, we placed AP sizing guide on distal femur and measured for a size 6 femoral component. We then drilled 2 pin holes in 3 degrees of external rotation relative to posterior condylar axis. Anteroposterior condylar bone was then resected. Resected posterior femoral bone measured approximately 3 mm in difference with the lateral piece thinner than the medial piece. Next, we subluxed the tibia forward and resected medial and lateral menisci. We then pinned our navigation tracker on to the proximal tibia and then performed our anatomy survey for tibia. We placed our proximal  tibial cutting guide such that we were perpendicular to the mechanical axis, aiming for about 1 to 2 mm of bony resection medially  and about  6 degrees posterior slope. Proximal tibial bone was then resected and detached from posterior soft tissues using Bovie cautery. Next, with knee at 90 degrees, we placed a laminar  in lateral compartment and resected the ACL as well as small osteophytes posteriorly along the femur. We then injected the posterior capsule with marcaine. We then assessed our gaps using a 10 mm spacer block. With the 10 mm spacer block, the knee came out to full extension with nice stability with varus and valgus stress, and nice symmetry between flexion and extension. We assessed our tibial cut and our alignment alysa fell within the center of the ankle. Once we were happy with soft tissue sleeves and bony cuts, we then placed tibial protector on the proximal tibia and we then created our chamfer cuts. We then placed our femoral trial. This was lateralized as much as possible and anchored using the lug hole screws.  Next, we subluxed the tibia forward, and sized our proximal tibia for a size 6 baseplate, the center of which was rotated such that it was in line with medial third tibial tubercle. This was then pinned in place. We then trialed using a 10 trial polyethylene. With 10 mm polyethylene, the knee came out to full extension. We had nice stability with varus and valgus stress and nice symmetry between flexion and extension. Patella tracked centrally within trochlear groove. Once we were happy with all our trial components, we then removed all our trial components, except the tibial baseplate. We then drilled our trabecular metal peg holes. We then drilled the sclerotic bone along the tibia using a long drill bit as well. We then placed the knee in extension and examined the posterior aspect of knee for bleeding. We then prepared our bony surfaces for implantation using pulse lavage  antibiotic saline. Femoral component was then impacted into palce. We then protected the femoral component with a lap pad and subluxed the tibia forward. we then subluxed the tibia forward and impacted the monoblock tibial component into place. We then reexamined our gaps, stability and tracking; all of which remained unchanged. We then copiously irrigated the knee with pulse lavage betadine saline. We then closed our medial parapatellar arthrotomy with a running #2 barbed suture,  subcutaneous deep fascial layer was closed with simple interrupted #1 vicryl suture, subcutaneous skin with 2-0 Vicryl and incision with silver water proof dressing. The patient was then transferred to Recovery Room bed in stable condition.

## 2023-05-15 NOTE — ANESTHESIA PROCEDURE NOTES
Peripheral Block    Patient location during procedure: post-op   Block not for primary anesthetic.  Reason for block: at surgeon's request and post-op pain management   Post-op Pain Location: Right Knee Pain   Start time: 5/15/2023 9:41 AM  Timeout: 5/15/2023 9:40 AM   End time: 5/15/2023 9:43 AM    Staffing  Authorizing Provider: Kleber Mcneil MD  Performing Provider: Kleber Mcneil MD    Preanesthetic Checklist  Completed: patient identified, IV checked, site marked, risks and benefits discussed, surgical consent, monitors and equipment checked, pre-op evaluation and timeout performed  Peripheral Block  Patient position: supine  Prep: ChloraPrep  Patient monitoring: heart rate, continuous pulse ox and frequent blood pressure checks  Block type: adductor canal  Laterality: right  Injection technique: single shot  Needle  Needle type: Stimuplex   Needle gauge: 19 G  Needle localization: ultrasound guidance         Additional Notes  10 mL of exparel + 10 mL of 0.25% bupivacaine

## 2023-05-15 NOTE — PLAN OF CARE
Problem: Occupational Therapy  Goal: Occupational Therapy Goal  Outcome: Adequate for Care Transition     Pt participating in initial OT/PT evaluations this date. Pt educated on adaptive dsg post surgery, home safety, car/toilet/shower/tub t/fs, use of DME for functional mobility and ADLs. Pt to attend OP PT tomorrow's date. D/c OT

## 2023-05-15 NOTE — PLAN OF CARE
Problem: Physical Therapy  Goal: Physical Therapy Goal  Outcome: Met     Educated pt and pt's wife on knee positioning with elevation to promote knee extension, use of ice pack for pain management, gait training with RW, car transfers, step negotiation, and LE exercises.  Completed RW fitting. Pt ambulated 50 ft with RW and SBA this date. Plan is for pt to d/c home today with OP PT to begin tomorrow's date.

## 2023-05-15 NOTE — ANESTHESIA PROCEDURE NOTES
Spinal    Diagnosis: RIght Knee Pain  Patient location during procedure: OR  Start time: 5/15/2023 7:19 AM  Timeout: 5/15/2023 7:18 AM  End time: 5/15/2023 7:22 AM    Staffing  Authorizing Provider: Kleber Mcneil MD  Performing Provider: Kleber Mcneil MD    Spinal Block  Injection technique: single shot  CSF Fluid: clear free-flowing CSF  Needle  Needle type: pencil-tip   Needle gauge: 22 G  Needle length: 3.5 in  Additional Documentation: incremental injection  Needle localization: anatomical landmarks  Additional Notes  1.6 mL of 0.75% bupivacaine

## 2023-05-15 NOTE — PATIENT INSTRUCTIONS
Post Op Total Knee Arthroplasty Instructions     1. Enteric coated aspirin 81 mg by mouth twice a day for 6 weeks to prevent blood clots,  unless otherwise indicated.  2. Please take a stomach reflux medication such as pepcid, prevacid, nexium (H-2 blocker or PPI) while on aspirin to prevent stomach ulcers. You will be given a prescription for pepcid.  3. Jitendra stockings should be worn as much as possible for 6 weeks to prevent blood clots.  4. Do not start antibiotics for any suspected infections related to the surgery until evaluated by dr guerin staff. 4 doses of Keflex will be prescribed after surgery.   5. No driving for approximately 2-4 weeks  6. You can shower once the incision is completely dry, otherwise place a new dry dressing twice a day if there is drainage. Please call the office if the drainage increases after discharge.  7. You may resume all pre-surgery medications unless otherwise instructed  8. All patients should be seen in Dr Guerin office approx 2 weeks after surgery  9. Dr Woodson prefers outpatient physical therapy upon discharge home. If home PT is needed, please contact Dr Woodson for approval  10. Patients should see their primary care doctor after discharge home  11. If you are sent to a rehab/nursing facility please notify Dr guerin office once discharged.  12. Pain control upon discharge: You will be given Diclofenac which you should take twice daily for 6 weeks. Tylenol prescription will also be given which should be taken every 4 hours for 2 weeks.

## 2023-05-15 NOTE — PLAN OF CARE
Pt up to BR to void without difficulty.  Meets criteria for discharge. VSS, AAOx3. Tolerating po fluids without nausea. IV discontinued with tip intact.Discharge instructions given. Time allowed for questions. Verbalizes understanding. Home prescriptions received from Pharmacy. Discharged to home in good condition.

## 2023-05-16 ENCOUNTER — CLINICAL SUPPORT (OUTPATIENT)
Dept: REHABILITATION | Facility: OTHER | Age: 68
End: 2023-05-16
Attending: ORTHOPAEDIC SURGERY
Payer: MEDICARE

## 2023-05-16 DIAGNOSIS — M25.661 DECREASED RANGE OF MOTION (ROM) OF RIGHT KNEE: ICD-10-CM

## 2023-05-16 DIAGNOSIS — G89.29 CHRONIC PAIN OF RIGHT KNEE: ICD-10-CM

## 2023-05-16 DIAGNOSIS — M17.11 PRIMARY OSTEOARTHRITIS OF RIGHT KNEE: ICD-10-CM

## 2023-05-16 DIAGNOSIS — M25.561 CHRONIC PAIN OF RIGHT KNEE: ICD-10-CM

## 2023-05-16 PROCEDURE — 97110 THERAPEUTIC EXERCISES: CPT | Mod: PN

## 2023-05-16 PROCEDURE — 97161 PT EVAL LOW COMPLEX 20 MIN: CPT | Mod: PN

## 2023-05-16 NOTE — PROGRESS NOTES
[facility]  Physical Therapy Initial Evaluation    Name: Diallo Castano  Clinic Number: [id number]    Therapy Diagnosis:   Encounter Diagnoses   Name Primary?    Primary osteoarthritis of right knee     Chronic pain of right knee      Physician: Lowell Woodson MD    Physician Orders: Physical Therapy Evaluate and Treat  Medical Diagnosis from Referral: right knee OA  Evaluation Date: 5/16/23  Authorization Period Expiration: 3/29/24  Plan of Care Expiration: 5/16/2023 to 8/16/23  Visit # / Visits authorized: 1/1 (pending additional authorization following initial evaluation)   FOTO: 1/3  on 5/16/23      Time In: 110p  Time Out: 200p  Total Billable Time: 50 minutes    Precautions: standard    Subjective     History of current condition - Diallo reports: right knee TKA 5/15/23    Chronic right knee pain - prior to COVID pt was walking 6-7 miles per day. Right > left knee pain decreased his activity level. After several rounds of Cortizone injections.     Medical History:   Past Medical History:   Diagnosis Date    Arthritis        Surgical History:   Diallo Castano  has a past surgical history that includes Knee surgery and Total knee arthroplasty (Right, 5/15/2023).    Medications:   Diallo has a current medication list which includes the following prescription(s): acetaminophen, amlodipine, aspirin, cephalexin, diclofenac, docusate sodium, famotidine, lisinopril-hydrochlorothiazide, metoprolol succinate, and simvastatin.    Allergies:   Review of patient's allergies indicates:  No Known Allergies     Imaging: changes of a right total knee arthroplasty are present without evidence of complication.  Expected fluid in air are present in the joint space.  Persistent soft tissue irregularity anteriorly.    Prior Therapy: NA  Social History: 66 yo male lives with wife - 5 steps to enter with rail  Occupation: retired -  with [employer]  Prior Level of Function: Limited 2nd to right knee  pain  Current Level of Function: unable to perform ADLs    Pain:  Current 3/10, worst 6/10, best 2/10   Location: right knee   Description: Aching  Aggravating Factors: constant  Easing Factors: movement    Pts goals: Pt would like to return to walking for exercise, gym workouts with no increase in right knee pain.    Objective     WNL=within normal limits  WFL=within functional limits  NT=not tested  *=pain    Posture: WNL  Palpation: NA  Sensation: intact  Deep tendon reflexes: NT    Observation: incision covered with waterproof dressing      Range of Motion:   Knee Left active Left Passive Right Active R passive   Flexion 125 130 90 95   Extension 0 0 -5 -3       Step down test: NT      Edema: patient had moderate edema at right knee which was appropriate considering his recent surgical procedure      CMS Impairment/Limitation/Restriction for FOTO Survey    Therapist reviewed FOTO scores for Diallo Castano on 5/16/2023.   FOTO documents entered into [facility] - see Media section.    Limitation Score: 57%  Predicted Goal: 33%    Category: Mobility     TREATMENT     Treatment Time In: 145p  Treatment Time Out: 200p  Total Treatment time separate from Evaluation: 15 minutes    Therapeutic Exercises were provided for 15 minutes to improve strength and AROM including:    Quadriceps set x20  Elevated ankle pump x20  Heel slides x10  Quadriceps set x10  SLR x 10      Home Exercises and Patient Education Provided:    Education provided:   - Findings; prognosis and plan of care (POC)  - Home exercise program (HEP)  - Modality options  - Therapist contact information    Written Home Exercises Provided: Yes  Exercises were reviewed and Diallo was able to demonstrate them prior to the end of the session.  Diallo demonstrated good understanding of the education provided.       Assessment     Diallo is a 67 y.o. male referred to outpatient Physical Therapy with a medical diagnosis of right knee oa s/p right TKA on 5/15/23. Pt  presents to PT with pain, decreased right knee ROM, decreased strength and flexibility, poor posture, and functional deficits with standing activity. These deficits are negatively impacting this patient's ability to complete their work duties and activities of daily living.     Pt prognosis is Good.   Pt will benefit from skilled outpatient Physical Therapy to address the deficits stated above and in the chart below, provide pt/family education, and to maximize pt's level of independence.     Plan of care discussed with patient: Yes  Pt's spiritual, cultural and educational needs considered and pt agreeable to plan of care and goals as stated below:     Anticipated Barriers for therapy: None      Medical Necessity is demonstrated by the following  History  Co-morbidities and personal factors that may impact the plan of care Co-morbidities:   NA    Personal Factors:   no deficits     low   Examination  Body Structures and Functions, activity limitations and participation restrictions that may impact the plan of care Body Regions:   lower extremities    Body Systems:    gross symmetry  ROM  strength  balance  gait  transfers  motor control    Participation Restrictions:   Walking    Activity limitations:   Learning and applying knowledge  no deficits    General Tasks and Commands  No Deficits    Communication  No Deficits    Mobility  walking  driving (bike, car, motorcycle)    Self care  washing oneself (bathing, drying, washing hands)  toileting  dressing    Domestic Life  No Deficits    Interactions/Relationships  No Deficits    Life Areas  No Deficits    Community and Social Life  No Deficits         low   Clinical Presentation stable and uncomplicated low   Decision Making/ Complexity Score: low     GOALS:  Short Term Goals (4 Weeks):  1. Patient will be compliant with home exercise program to supplement therapy in restoring pain free function. (progressing, not met)    2. Patient will improve impaired lower  extremity manual muscle tests to >/= 4/5 to improve dynamic hip/knee support for functional tasks. (progressing, not met)    3. Pt will tolerate Standing for 30 minutes with no increase in right knee pain to return to PLOF. (progressing, not met)    4. Pt will improve right knee pain to 4/10 at worst for improved QOL. (progressing, not met)        Long Term Goals (8 Weeks):  1. Patient will improve FOTO score to </= 33% limited to decrease perceived limitation with mobility. (progressing, not met)    2. Patient will improve impaired lower extremity manual muscle tests to >/= 4+/5 to improve dynamic hip/knee support for functional tasks. (progressing, not met)    3. Patient will tolerate walking for 30 minutes with no increase in right knee pain to return to PLOF. (progressing, not met)    4. Patient will perform all work tasks and recreational activity without increased right knee pain.   (progressing, not met)          Plan     Plan of care Certification: 5/16/2023 to 8/16/23    Outpatient Physical Therapy 3 times weekly for 12 weeks to include the following interventions: Therapeutic Exercises, Manual Therapeutic Technique, Neuromuscular Re Education, Therapeutic Activities. Modalities, Kinesiotape prn, and Functional Dry Needling as needed.    Kleber Mackenzie, PT,  DPT, OCS

## 2023-05-17 ENCOUNTER — CLINICAL SUPPORT (OUTPATIENT)
Dept: REHABILITATION | Facility: OTHER | Age: 68
End: 2023-05-17
Payer: MEDICARE

## 2023-05-17 DIAGNOSIS — M25.661 DECREASED RANGE OF MOTION (ROM) OF RIGHT KNEE: Primary | ICD-10-CM

## 2023-05-17 PROCEDURE — 97112 NEUROMUSCULAR REEDUCATION: CPT | Mod: PN

## 2023-05-17 PROCEDURE — 97140 MANUAL THERAPY 1/> REGIONS: CPT | Mod: PN

## 2023-05-17 PROCEDURE — 97530 THERAPEUTIC ACTIVITIES: CPT | Mod: PN

## 2023-05-17 NOTE — PROGRESS NOTES
"[facility]   Physical Therapy Treatment Note     Name: Diallo Castano  Clinic Number: [id number]    Therapy Diagnosis:   Encounter Diagnosis   Name Primary?    Decreased range of motion (ROM) of right knee Yes     Physician: Lowell Woodson MD    Visit Date: 5/17/2023    Physician Orders: PT Evaluate and Treat  Medical Diagnosis:   M25.561,G89.29 (ICD-10-CM) - Chronic pain of right knee   M17.11 (ICD-10-CM) - Primary osteoarthritis of right knee     Evaluation Date: 5/16/23  Authorization Period Expiration: 5/15/24  Plan of Care Certification Period: 5/15/24- 7/15/24  Progress Note Due: 6/15/24    Visit # / Visits authorized: 1/ 20   FOTO: 1/ 3       Precautions: Standard    Time In: 11:00am   Time Out: 12:00pm  Total Billable Time: 60 minutes      SUBJECTIVE     Pt reports: that he is doing well today. Pt states that his right knee is very painful but he is able to straighten and bend it more.   He was compliant with home exercise program.  Response to previous treatment: anterior right knee pain  Functional change: Pt now walking with SPC not using rolling walker.     Pain: 4/10  Location: right knee      OBJECTIVE     Objective Measures updated at progress report unless specified.       TREATMENT     Total Treatment time (time-based codes) separate from Evaluation: 60 minutes     Diallo received the treatments listed below:        Patient received manual therapeutic technique for 22 minutes for improved soft tissue and joint mobility including:  AAROM R Knee flexion with slide board and strap-  gentle overpressure from PT into flexion  Sup/inf and M/L glides to R patella           Patient received neuromuscular reeducation for 30 minutes for improved proprioception and balance including:  R Quad Set with 5" hold 30x  R SLR 3x10 repetitions  R SL hip abduction 3x10 repetitions  R LAQ with 5" hold R LE 30x   R Heel slides 30x5" hold with strap and slide board      Patient received " therapeutic activities for 8 minutes for improved tolerance to functional activities including:  Recumbent bike rocking level 1.0 x8 minutes for improved joint lubrication and AROM      PATIENT EDUCATION AND HOME EXERCISES     Home Exercises Provided and Patient Education Provided     Education provided:   PT educated pt on importance of compliance with their HEP this visit.     Written Home Exercises Provided: Patient instructed to cont prior HEP. Exercises were reviewed and Diallo was able to demonstrate them prior to the end of the session.  Diallo demonstrated good  understanding of the education provided. See EMR under Patient Instructions for exercises provided during therapy sessions    ASSESSMENT   Pt tolerated tx session well today and completed all therapeutic exercises with minimal/no increase in anterior right knee pain. Progressed quad strengthening exercises this visit.     Diallo Is progressing well towards his goals.   Pt prognosis is Good.     Pt will continue to benefit from skilled outpatient physical therapy to address the deficits listed in the problem list box on initial evaluation, provide pt/family education and to maximize pt's level of independence in the home and community environment.     Pt's spiritual, cultural and educational needs considered and pt agreeable to plan of care and goals.     Anticipated barriers to physical therapy: None    Goals:       PLAN   Continue PT POC      Snow Jean, PT

## 2023-05-18 ENCOUNTER — CLINICAL SUPPORT (OUTPATIENT)
Dept: REHABILITATION | Facility: OTHER | Age: 68
End: 2023-05-18
Attending: ORTHOPAEDIC SURGERY
Payer: MEDICARE

## 2023-05-18 DIAGNOSIS — M25.661 DECREASED RANGE OF MOTION (ROM) OF RIGHT KNEE: Primary | ICD-10-CM

## 2023-05-18 PROCEDURE — 97112 NEUROMUSCULAR REEDUCATION: CPT | Mod: PN

## 2023-05-18 PROCEDURE — 97110 THERAPEUTIC EXERCISES: CPT | Mod: PN

## 2023-05-18 PROCEDURE — 97140 MANUAL THERAPY 1/> REGIONS: CPT | Mod: PN

## 2023-05-18 NOTE — PROGRESS NOTES
[facility]   Physical Therapy Treatment Note     Name: Diallo Castano  Clinic Number: [id number]    Therapy Diagnosis:   Encounter Diagnosis   Name Primary?    Decreased range of motion (ROM) of right knee Yes     Physician: Lowell Woodson MD    Visit Date: 5/18/2023    Physician Orders: PT Evaluate and Treat  Medical Diagnosis:   M25.561,G89.29 (ICD-10-CM) - Chronic pain of right knee   M17.11 (ICD-10-CM) - Primary osteoarthritis of right knee     Evaluation Date: 5/16/23  Authorization Period Expiration: 5/15/24  Plan of Care Certification Period: 5/15/24- 7/15/24  Progress Note Due: 6/15/24    Visit # / Visits authorized: 2 / 20   FOTO: 1/ 3       Precautions: Standard    Time In: 100pm   Time Out: 200pm  Total Billable Time: 60 minutes      SUBJECTIVE     Pt reports: that he is doing well today. He feels like physical therapy yesterday really loosened things up.    He was compliant with home exercise program.  Response to previous treatment: anterior right knee pain  Functional change: Pt now walking with SPC not using rolling walker.     Pain: 4/10  Location: right knee      OBJECTIVE     Objective Measures updated at progress report unless specified.       TREATMENT     Total Treatment time (time-based codes) separate from Evaluation: 60 minutes     Diallo received the treatments listed below:      therapeutic exercises to develop strength, endurance, ROM, flexibility, and core stabilization for 8 minutes including:    Recumbent bike level 4.0 x8 minutes at setting 13 for improved joint lubrication and AROM      Patient received manual therapeutic technique for 10 minutes for improved soft tissue and joint mobility including:    AAROM R Knee flexion with slide board and strap-  gentle overpressure from PT into flexion  Sup/inf and M/L glides to R patella         Patient received neuromuscular reeducation for 35 minutes for improved proprioception and balance including:    R Quad  "Set with 5" hold 30x  R SLR 1# 2x20 repetitions  R SL hip abduction 2x15 repetitions  Prone hip extension 2x10  Prone knee flexion 2x20  R LAQ with 5" hold R LE 30x   R Heel slides 30x5" hold with strap and slide board  Supine hamstring stretch with strap 4x30" bilaterally      Patient received therapeutic activities for 0 minutes for improved tolerance to functional activities including:        gait training to improve functional mobility and safety for 5 minutes, including:    Gait training on turf without use of AD - gait mechanics looks improved. Patient continues to demonstrate decreased knee extension at terminal swing        PATIENT EDUCATION AND HOME EXERCISES     Home Exercises Provided and Patient Education Provided     Education provided:   PT educated pt on importance of compliance with their HEP this visit.     Written Home Exercises Provided: Patient instructed to cont prior HEP. Exercises were reviewed and Diallo was able to demonstrate them prior to the end of the session.  Diallo demonstrated good  understanding of the education provided. See EMR under Patient Instructions for exercises provided during therapy sessions    ASSESSMENT     Pt tolerated therapeutic interventions well with no complaint of increased pain. Patient reported relief after physical therapy treatment today.    Diallo Is progressing well towards his goals.   Pt prognosis is Good.     Pt will continue to benefit from skilled outpatient physical therapy to address the deficits listed in the problem list box on initial evaluation, provide pt/family education and to maximize pt's level of independence in the home and community environment.     Pt's spiritual, cultural and educational needs considered and pt agreeable to plan of care and goals.     Anticipated barriers to physical therapy: None    Goals:       PLAN   Plan of care Certification: 5/16/2023 to 8/16/23     Outpatient Physical Therapy 3 times weekly for 12 weeks to include " the following interventions: Therapeutic Exercises, Manual Therapeutic Technique, Neuromuscular Re Education, Therapeutic Activities. Modalities, Kinesiotape prn, and Functional Dry Needling as needed.      Kleber Mackenzie, PT

## 2023-05-19 ENCOUNTER — CLINICAL SUPPORT (OUTPATIENT)
Dept: REHABILITATION | Facility: OTHER | Age: 68
End: 2023-05-19
Attending: ORTHOPAEDIC SURGERY
Payer: MEDICARE

## 2023-05-19 DIAGNOSIS — M25.661 DECREASED RANGE OF MOTION (ROM) OF RIGHT KNEE: Primary | ICD-10-CM

## 2023-05-19 DIAGNOSIS — G89.29 CHRONIC PAIN OF RIGHT KNEE: ICD-10-CM

## 2023-05-19 DIAGNOSIS — M17.11 PRIMARY OSTEOARTHRITIS OF RIGHT KNEE: ICD-10-CM

## 2023-05-19 DIAGNOSIS — M25.561 CHRONIC PAIN OF RIGHT KNEE: ICD-10-CM

## 2023-05-19 PROCEDURE — 97112 NEUROMUSCULAR REEDUCATION: CPT | Mod: PN | Performed by: PHYSICAL THERAPIST

## 2023-05-19 PROCEDURE — 97116 GAIT TRAINING THERAPY: CPT | Mod: PN | Performed by: PHYSICAL THERAPIST

## 2023-05-19 NOTE — PROGRESS NOTES
[facility]   Physical Therapy Treatment Note     Name: Diallo Castano  Clinic Number: [id number]    Therapy Diagnosis:   Encounter Diagnoses   Name Primary?    Decreased range of motion (ROM) of right knee Yes    Primary osteoarthritis of right knee     Chronic pain of right knee        Physician: Lowell Woodson MD    Visit Date: 5/19/2023    Physician Orders: PT Evaluate and Treat  Medical Diagnosis:   M25.561,G89.29 (ICD-10-CM) - Chronic pain of right knee   M17.11 (ICD-10-CM) - Primary osteoarthritis of right knee     Evaluation Date: 5/16/23  Authorization Period Expiration: 5/15/24  Plan of Care Certification Period: 5/15/24- 7/15/24  Progress Note Due: 6/15/24    Visit # / Visits authorized: 2 / 20   FOTO: 1/ 3       Precautions: Standard    Time In: 1020   Time Out: 11am  Total Billable Time: 40 minutes      SUBJECTIVE     Pt reports: that he is doing well today. He feels like physical therapy yesterday really loosened things up.    He was compliant with home exercise program.  Response to previous treatment: anterior right knee pain  Functional change: Pt now walking with SPC not using rolling walker.     Pain: 4/10  Location: right knee      OBJECTIVE     Objective Measures updated at progress report unless specified.       TREATMENT     Total Treatment time (time-based codes) separate from Evaluation: 60 minutes     Diallo received the treatments listed below:      therapeutic exercises to develop strength, endurance, ROM, flexibility, and core stabilization for 0 minutes including:    Recumbent bike level 0.0 x10 minutes at setting 13 for improved joint lubrication and AROM; at end of session    Patient received manual therapeutic technique for 0 minutes for improved soft tissue and joint mobility including:    AAROM R Knee flexion with slide board and strap-  gentle overpressure from PT into flexion  Sup/inf and M/L glides to R patella     Patient received neuromuscular  "reeducation for 30 minutes for improved proprioception and balance including:    R Quad Set with 5" hold 30x with R heel on L toes (pt preference to get more extension)  R SLR 1# 2x20 repetitions  R SL hip abduction 2x15 repetitions  Prone hip extension 2x10  Prone knee flexion 2x20  R LAQ with 5" hold R LE 30x   R Heel slides 30x5" hold with strap and slide board  Supine hamstring stretch with strap 4x30" bilaterally  B SAQ with ball btw ankles 10reps x2 sets      Patient received therapeutic activities for 0 minutes for improved tolerance to functional activities including:        gait training to improve functional mobility and safety for 10 minutes, including:    Gait training on turf without use of AD - gait mechanics looks improved. + walking backwards and step overs 4 laps each Patient continues to demonstrate decreased knee extension at terminal swing          PATIENT EDUCATION AND HOME EXERCISES     Home Exercises Provided and Patient Education Provided     Education provided:   PT educated pt on importance of compliance with their HEP this visit.     Written Home Exercises Provided: Patient instructed to cont prior HEP. Exercises were reviewed and Diallo was able to demonstrate them prior to the end of the session.  Diallo demonstrated good  understanding of the education provided. See EMR under Patient Instructions for exercises provided during therapy sessions    ASSESSMENT     Pt tolerated therapeutic interventions well with no complaint of increased pain. Patient reported relief after physical therapy treatment today.    Diallo Is progressing well towards his goals.   Pt prognosis is Good.     Pt will continue to benefit from skilled outpatient physical therapy to address the deficits listed in the problem list box on initial evaluation, provide pt/family education and to maximize pt's level of independence in the home and community environment.     Pt's spiritual, cultural and educational needs " considered and pt agreeable to plan of care and goals.     Anticipated barriers to physical therapy: None    Goals:   Short Term Goals (4 Weeks):  1. Patient will be compliant with home exercise program to supplement therapy in restoring pain free function. (progressing, not met)    2. Patient will improve impaired lower extremity manual muscle tests to >/= 4/5 to improve dynamic hip/knee support for functional tasks. (progressing, not met)    3. Pt will tolerate Standing for 30 minutes with no increase in right knee pain to return to PLOF. (progressing, not met)    4. Pt will improve right knee pain to 4/10 at worst for improved QOL. (progressing, not met)          Long Term Goals (8 Weeks):  1. Patient will improve FOTO score to </= 33% limited to decrease perceived limitation with mobility. (progressing, not met)    2. Patient will improve impaired lower extremity manual muscle tests to >/= 4+/5 to improve dynamic hip/knee support for functional tasks. (progressing, not met)    3. Patient will tolerate walking for 30 minutes with no increase in right knee pain to return to PLOF. (progressing, not met)    4. Patient will perform all work tasks and recreational activity without increased right knee pain.   (progressing, not met)        PLAN   Plan of care Certification: 5/16/2023 to 8/16/23     Outpatient Physical Therapy 3 times weekly for 12 weeks to include the following interventions: Therapeutic Exercises, Manual Therapeutic Technique, Neuromuscular Re Education, Therapeutic Activities. Modalities, Kinesiotape prn, and Functional Dry Needling as needed.      Tara Meeks, PT, DPT, MHA, CLT

## 2023-05-22 ENCOUNTER — CLINICAL SUPPORT (OUTPATIENT)
Dept: REHABILITATION | Facility: OTHER | Age: 68
End: 2023-05-22
Payer: MEDICARE

## 2023-05-22 DIAGNOSIS — M25.661 DECREASED RANGE OF MOTION (ROM) OF RIGHT KNEE: Primary | ICD-10-CM

## 2023-05-22 PROCEDURE — 97112 NEUROMUSCULAR REEDUCATION: CPT | Mod: PN

## 2023-05-22 PROCEDURE — 97140 MANUAL THERAPY 1/> REGIONS: CPT | Mod: PN

## 2023-05-22 PROCEDURE — 97530 THERAPEUTIC ACTIVITIES: CPT | Mod: PN

## 2023-05-22 NOTE — PROGRESS NOTES
[facility]   Physical Therapy Treatment Note     Name: Diallo Castano  Clinic Number: [id number]    Therapy Diagnosis:   Encounter Diagnosis   Name Primary?    Decreased range of motion (ROM) of right knee Yes       Physician: Lowell Woodson MD    Visit Date: 5/22/2023    Physician Orders: PT Evaluate and Treat  Medical Diagnosis:   M25.561,G89.29 (ICD-10-CM) - Chronic pain of right knee   M17.11 (ICD-10-CM) - Primary osteoarthritis of right knee     Evaluation Date: 5/16/23  Authorization Period Expiration: 5/15/24  Plan of Care Certification Period: 5/15/24- 7/15/24  Progress Note Due: 6/15/24    Visit # / Visits authorized: 4 / 20   FOTO: 1/ 3       Precautions: Standard    Time In: 0300pm  Time Out: 0408pm  Total Billable Time: 68 minutes      SUBJECTIVE     Pt reports: that he is doing well today. Pt states that walking, stair climbing, and static standing are all getting easier for him. Pt does his exercises multiple times per day.     He was compliant with home exercise program.  Response to previous treatment: anterior right knee pain  Functional change: Pt now walking with SPC not using rolling walker.     Pain: 4/10  Location: right knee      OBJECTIVE     5/22/23:  R Knee Flexion: 110 degrees  R Knee Extension: 0 degrees     5x Sit <> Stand: TBA  TUG: TBA    TREATMENT     Total Treatment time (time-based codes) separate from Evaluation: 60 minutes     Diallo received the treatments listed below:      therapeutic exercises to develop strength, endurance, ROM, flexibility, and core stabilization for 0 minutes including:      Patient received manual therapeutic technique for 8 minutes for improved soft tissue and joint mobility including:    AAROM R Knee flexion with slide board and strap-  gentle overpressure from PT into flexion  Sup/inf and M/L glides to R patella     Patient received neuromuscular reeducation for 30 minutes for improved proprioception and balance  "including:    R Quad Set with 5" hold 30x with R heel on L toes (pt preference to get more extension)  R SLR 1# 2x20 repetitions  R SL hip abduction 2x15 repetitions  Prone hip extension 2x10  Prone knee flexion 2x20  R LAQ with 5" hold R LE 30x   R Heel slides 30x5" hold with strap and slide board  Supine hamstring stretch with strap 4x30" bilaterally  B SAQ with ball btw ankles 10reps x2 sets      Patient received therapeutic activities for 30 minutes for improved tolerance to functional activities including:    Recumbent bike level 0.0 x10 minutes at setting 13 for improved joint lubrication and AROM; at end of session  +Step ups on 6" step 3x10 repetitions  +Step downs on 6" step 3x10 repetitions  +Heel raises on 6 inch step       gait training to improve functional mobility and safety for 10 minutes, including:    Gait training on turf without use of AD - gait mechanics looks improved. + walking backwards and step overs 4 laps each Patient continues to demonstrate decreased knee extension at terminal swing          PATIENT EDUCATION AND HOME EXERCISES     Home Exercises Provided and Patient Education Provided     Education provided:   PT educated pt on importance of compliance with their HEP this visit.     Written Home Exercises Provided: Patient instructed to cont prior HEP. Exercises were reviewed and Diallo was able to demonstrate them prior to the end of the session.  Diallo demonstrated good  understanding of the education provided. See EMR under Patient Instructions for exercises provided during therapy sessions    ASSESSMENT     Pt tolerated treatment session well today and demonstrates improved knee flexion AROM following manual therapy intervention. Pt collapses forward with step down exercise demonstrating continued need for eccentric quadriceps strengthening.     Diallo Is progressing well towards his goals.   Pt prognosis is Good.     Pt will continue to benefit from skilled outpatient physical " therapy to address the deficits listed in the problem list box on initial evaluation, provide pt/family education and to maximize pt's level of independence in the home and community environment.     Pt's spiritual, cultural and educational needs considered and pt agreeable to plan of care and goals.     Anticipated barriers to physical therapy: None    Goals:   Short Term Goals (4 Weeks):  1. Patient will be compliant with home exercise program to supplement therapy in restoring pain free function. (progressing, not met)    2. Patient will improve impaired lower extremity manual muscle tests to >/= 4/5 to improve dynamic hip/knee support for functional tasks. (progressing, not met)    3. Pt will tolerate Standing for 30 minutes with no increase in right knee pain to return to PLOF. (progressing, not met)    4. Pt will improve right knee pain to 4/10 at worst for improved QOL. (progressing, not met)          Long Term Goals (8 Weeks):  1. Patient will improve FOTO score to </= 33% limited to decrease perceived limitation with mobility. (progressing, not met)    2. Patient will improve impaired lower extremity manual muscle tests to >/= 4+/5 to improve dynamic hip/knee support for functional tasks. (progressing, not met)    3. Patient will tolerate walking for 30 minutes with no increase in right knee pain to return to PLOF. (progressing, not met)    4. Patient will perform all work tasks and recreational activity without increased right knee pain.   (progressing, not met)        PLAN   Plan of care Certification: 5/16/2023 to 8/16/23     Outpatient Physical Therapy 3 times weekly for 12 weeks to include the following interventions: Therapeutic Exercises, Manual Therapeutic Technique, Neuromuscular Re Education, Therapeutic Activities. Modalities, Kinesiotape prn, and Functional Dry Needling as needed.      Snow Jean, PT, DPT

## 2023-05-24 ENCOUNTER — CLINICAL SUPPORT (OUTPATIENT)
Dept: REHABILITATION | Facility: OTHER | Age: 68
End: 2023-05-24
Payer: MEDICARE

## 2023-05-24 DIAGNOSIS — M25.661 DECREASED RANGE OF MOTION (ROM) OF RIGHT KNEE: Primary | ICD-10-CM

## 2023-05-24 PROCEDURE — 97112 NEUROMUSCULAR REEDUCATION: CPT | Mod: PN

## 2023-05-24 PROCEDURE — 97530 THERAPEUTIC ACTIVITIES: CPT | Mod: PN

## 2023-05-24 NOTE — PROGRESS NOTES
"  [facility]   Physical Therapy Treatment Note     Name: Diallo Castano  Clinic Number: [id number]    Therapy Diagnosis:   Encounter Diagnosis   Name Primary?    Decreased range of motion (ROM) of right knee Yes       Physician: Lowell Woodson MD    Visit Date: 5/24/2023    Physician Orders: PT Evaluate and Treat  Medical Diagnosis:   M25.561,G89.29 (ICD-10-CM) - Chronic pain of right knee   M17.11 (ICD-10-CM) - Primary osteoarthritis of right knee     Evaluation Date: 5/16/23  Authorization Period Expiration: 5/15/24  Plan of Care Certification Period: 5/15/24- 7/15/24  Progress Note Due: 6/15/24    Visit # / Visits authorized: 5 / 20   FOTO: 1/ 3       Precautions: Standard    Time In: 0200pm  Time Out: 0300pm  Total Billable Time: 60 minutes      SUBJECTIVE     Pt reports: that he is doing fairly today. Pt states that he experienced swelling at the base of his knee after performing step down exercises at his last treatment session. Pt states that this pain/swelling resolved within 24 hours. However, he continues to experience "tightness" at the base of his knee.   He was compliant with home exercise program.  Response to previous treatment: anterior right knee pain  Functional change: Pt now walking with SPC not using rolling walker.     Pain: 4/10  Location: right knee      OBJECTIVE     5/22/23:  R Knee Flexion: 110 degrees  R Knee Extension: 0 degrees     5x Sit <> Stand: TBA  TUG: TBA    TREATMENT     Total Treatment time (time-based codes) separate from Evaluation: 60 minutes     Diallo received the treatments listed below:      therapeutic exercises to develop strength, endurance, ROM, flexibility, and core stabilization for 0 minutes including:      Patient received manual therapeutic technique for 0 minutes for improved soft tissue and joint mobility including:    AAROM R Knee flexion with slide board and strap-  gentle overpressure from PT into flexion  Sup/inf and M/L glides " "to R patella     Patient received neuromuscular reeducation for 45 minutes for improved proprioception and balance including:    R Quad Set with 5" hold 30x with R heel on L toes (pt preference to get more extension)  R SLR 1# 2x20 repetitions  R SL hip abduction 2x15 repetitions  Prone hip extension 2x10  Prone knee flexion 2x20  R LAQ with 5" hold R LE 30x   R Heel slides 30x5" hold with strap and slide board  Supine hamstring stretch with strap 4x30" bilaterally  R SAQ 30x 5" hold   +Calf stretch on wedge 3x30" hold      Patient received therapeutic activities for 15 minutes for improved tolerance to functional activities including:    Recumbent bike level 0.0 x10 minutes at setting 13 for improved joint lubrication and AROM; at end of session  Step ups on 6" step 3x10 repetitions  Step downs on 6" step 3x10 repetitions  Heel raises on 6 inch step   +Squats with B UE support 3x10 repetitions      gait training to improve functional mobility and safety for 0 minutes, including:    Gait training on turf without use of AD - gait mechanics looks improved. walking backwards and step overs 4 laps each Patient continues to demonstrate decreased knee extension at terminal swing          PATIENT EDUCATION AND HOME EXERCISES     Home Exercises Provided and Patient Education Provided     Education provided:   PT educated pt on importance of compliance with their HEP this visit.     Written Home Exercises Provided: Patient instructed to cont prior HEP. Exercises were reviewed and Diallo was able to demonstrate them prior to the end of the session.  Diallo demonstrated good  understanding of the education provided. See EMR under Patient Instructions for exercises provided during therapy sessions    ASSESSMENT     Pt tolerated treatment session well today. Held step up/down exercises today 2/2 new onset of anterior knee pain. Pt is still ahead of schedule with his progress. Continue to progress pt as tolerated per protocol. " Continue PT POC.    Diallo Is progressing well towards his goals.   Pt prognosis is Good.     Pt will continue to benefit from skilled outpatient physical therapy to address the deficits listed in the problem list box on initial evaluation, provide pt/family education and to maximize pt's level of independence in the home and community environment.     Pt's spiritual, cultural and educational needs considered and pt agreeable to plan of care and goals.     Anticipated barriers to physical therapy: None    Goals:   Short Term Goals (4 Weeks):  1. Patient will be compliant with home exercise program to supplement therapy in restoring pain free function. (progressing, not met)    2. Patient will improve impaired lower extremity manual muscle tests to >/= 4/5 to improve dynamic hip/knee support for functional tasks. (progressing, not met)    3. Pt will tolerate Standing for 30 minutes with no increase in right knee pain to return to PLOF. (progressing, not met)    4. Pt will improve right knee pain to 4/10 at worst for improved QOL. (progressing, not met)          Long Term Goals (8 Weeks):  1. Patient will improve FOTO score to </= 33% limited to decrease perceived limitation with mobility. (progressing, not met)    2. Patient will improve impaired lower extremity manual muscle tests to >/= 4+/5 to improve dynamic hip/knee support for functional tasks. (progressing, not met)    3. Patient will tolerate walking for 30 minutes with no increase in right knee pain to return to PLOF. (progressing, not met)    4. Patient will perform all work tasks and recreational activity without increased right knee pain.   (progressing, not met)        PLAN   Plan of care Certification: 5/16/2023 to 8/16/23     Outpatient Physical Therapy 3 times weekly for 12 weeks to include the following interventions: Therapeutic Exercises, Manual Therapeutic Technique, Neuromuscular Re Education, Therapeutic Activities. Modalities, Kinesiotape prn,  and Functional Dry Needling as needed.      Snow Jean, PT, DPT

## 2023-05-26 ENCOUNTER — CLINICAL SUPPORT (OUTPATIENT)
Dept: REHABILITATION | Facility: OTHER | Age: 68
End: 2023-05-26
Attending: ORTHOPAEDIC SURGERY
Payer: MEDICARE

## 2023-05-26 DIAGNOSIS — M25.661 DECREASED RANGE OF MOTION (ROM) OF RIGHT KNEE: Primary | ICD-10-CM

## 2023-05-26 PROCEDURE — 97530 THERAPEUTIC ACTIVITIES: CPT | Mod: PN | Performed by: PHYSICAL THERAPIST

## 2023-05-26 PROCEDURE — 97112 NEUROMUSCULAR REEDUCATION: CPT | Mod: PN | Performed by: PHYSICAL THERAPIST

## 2023-05-26 PROCEDURE — 97140 MANUAL THERAPY 1/> REGIONS: CPT | Mod: PN | Performed by: PHYSICAL THERAPIST

## 2023-05-30 ENCOUNTER — CLINICAL SUPPORT (OUTPATIENT)
Dept: REHABILITATION | Facility: OTHER | Age: 68
End: 2023-05-30
Attending: ORTHOPAEDIC SURGERY
Payer: MEDICARE

## 2023-05-30 ENCOUNTER — OFFICE VISIT (OUTPATIENT)
Dept: ORTHOPEDICS | Facility: CLINIC | Age: 68
End: 2023-05-30
Payer: MEDICARE

## 2023-05-30 VITALS
WEIGHT: 281.5 LBS | BODY MASS INDEX: 41.69 KG/M2 | HEIGHT: 69 IN | HEART RATE: 64 BPM | DIASTOLIC BLOOD PRESSURE: 77 MMHG | SYSTOLIC BLOOD PRESSURE: 145 MMHG

## 2023-05-30 DIAGNOSIS — Z47.1 AFTERCARE FOLLOWING RIGHT KNEE JOINT REPLACEMENT SURGERY: Primary | ICD-10-CM

## 2023-05-30 DIAGNOSIS — M25.661 DECREASED RANGE OF MOTION (ROM) OF RIGHT KNEE: Primary | ICD-10-CM

## 2023-05-30 DIAGNOSIS — Z96.651 AFTERCARE FOLLOWING RIGHT KNEE JOINT REPLACEMENT SURGERY: Primary | ICD-10-CM

## 2023-05-30 PROCEDURE — 99999 PR PBB SHADOW E&M-EST. PATIENT-LVL III: CPT | Mod: PBBFAC,,, | Performed by: ORTHOPAEDIC SURGERY

## 2023-05-30 PROCEDURE — 99024 PR POST-OP FOLLOW-UP VISIT: ICD-10-PCS | Mod: POP,,, | Performed by: ORTHOPAEDIC SURGERY

## 2023-05-30 PROCEDURE — 99999 PR PBB SHADOW E&M-EST. PATIENT-LVL III: ICD-10-PCS | Mod: PBBFAC,,, | Performed by: ORTHOPAEDIC SURGERY

## 2023-05-30 PROCEDURE — 97110 THERAPEUTIC EXERCISES: CPT | Mod: PN | Performed by: INTERNAL MEDICINE

## 2023-05-30 PROCEDURE — 97112 NEUROMUSCULAR REEDUCATION: CPT | Mod: PN | Performed by: INTERNAL MEDICINE

## 2023-05-30 PROCEDURE — 97530 THERAPEUTIC ACTIVITIES: CPT | Mod: PN | Performed by: INTERNAL MEDICINE

## 2023-05-30 PROCEDURE — 99213 OFFICE O/P EST LOW 20 MIN: CPT | Mod: PBBFAC,PN | Performed by: ORTHOPAEDIC SURGERY

## 2023-05-30 PROCEDURE — 99024 POSTOP FOLLOW-UP VISIT: CPT | Mod: POP,,, | Performed by: ORTHOPAEDIC SURGERY

## 2023-05-30 NOTE — PROGRESS NOTES
Subjective:      Patient ID: Diallo Castano is a 67 y.o. male.    Chief Complaint: Pain and Post-op Evaluation of the Right Knee    Patient is 3 months s/p  right primary total knee replacement  Anterior knee pain: No  Has improved pain  Is in physical therapy  Yes  Problems w incision  No  Is  happy with result  Yes  Opiod free: Yes       Social History     Occupational History    Not on file   Tobacco Use    Smoking status: Never    Smokeless tobacco: Never   Substance and Sexual Activity    Alcohol use: Yes    Drug use: Never    Sexual activity: Not on file      ROS      Objective:    General    Constitutional: He is oriented to person, place, and time. He appears well-developed and well-nourished.   HENT:   Head: Normocephalic and atraumatic.   Nose: Nose normal.   Eyes: EOM are normal.   Pulmonary/Chest: Effort normal.   Neurological: He is alert and oriented to person, place, and time.   Psychiatric: He has a normal mood and affect. His behavior is normal. Judgment and thought content normal.     General Musculoskeletal Exam   Gait: antalgic       Right Knee Exam     Inspection   Swelling: present  Effusion: present    Range of Motion   Extension:  0   Flexion:  90     Tests   Ligament Examination   MCL - Valgus: normal (0 to 2mm)  LCL - Varus: normal    Other   Sensation: decreased    Comments:  Incision clean, dry intact    Muscle Strength   Right Lower Extremity   Quadriceps:  4/5   Hamstrin/5     Vascular Exam       Edema  Right Lower Leg: present       Assessment:       1. Aftercare following right knee joint replacement surgery          Plan:       Overall patient appears to be doing well and is happy with the result of the knee arthroplasty. They can continue activities as tolerated avoiding high impact activities.  I would like to see the patient back In 3 months with xrays.

## 2023-05-30 NOTE — PROGRESS NOTES
"  [facility]   Physical Therapy Treatment Note     Name: Diallo Castano  Clinic Number: [id number]    Therapy Diagnosis:   Encounter Diagnosis   Name Primary?    Decreased range of motion (ROM) of right knee Yes         Physician: Lowell Woodson MD    Visit Date: 2023      Physician Orders: PT Evaluate and Treat  Medical Diagnosis:   M25.561,G89.29 (ICD-10-CM) - Chronic pain of right knee   M17.11 (ICD-10-CM) - Primary osteoarthritis of right knee     Evaluation Date: 23  Authorization Period Expiration: 5/15/24  Plan of Care Certification Period: 5/15/24- 7/15/24  Progress Note Due: 6/15/24    Visit # / Visits authorized:    FOTO: 2/ 3       Precautions: Standard    Time In 155 pm   Time Out:  250 pm   Total Billable Time: 50  minutes      SUBJECTIVE     Pt reports:stiff. Saw Dr Woodson today. Mornings are not so bad but  knee swells during the day and feels weaker.   He was compliant with home exercise program.  Response to previous treatment: no c/o  Functional change: [facility]    Pain: 10  Location: right knee      OBJECTIVE    Sit to stand 30 sec : 17.5   Tug 8 sec      108 stairs aarom flex R , ext - 6 prone  Ext L + 8  prone    2023    +5x Sit <> Stand: 19"  +TU"    23:  R Knee Flexion: 110 degrees  R Knee Extension: 0 degrees     FOTO        TREATMENT     Total Treatment time (time-based codes) separate from Evaluation: 55 minutes     Diallo received the treatments listed below:      therapeutic exercises to develop strength, endurance, ROM, flexibility, and core stabilization for 34  minutes including:   R SLR  3 x 10 repetitions  R SL hip abduction 2x15 repetitions 1 #  Prone hip extension 3x10   Prone knee flex strap 2 min   Prone knee hang 3 min   + LAQ 3 x 10  Calf stretch on wedge 90 sec hold  Heel raises on 6 inch step  x 30 reps    Manual therapy to improve rom and decrease pain x 3 min :   Sup/inf and M/L glides to R patella x 2 min " "  Prom knee flex 20 sec x 3     Patient received neuromuscular reeducation for  10 minutes for improved proprioception and balance including:  Side steps 40 ft x 2   Hesitation walk 40 ft x 2   R Quad Set with 5" hold 30x with R heel on L toes (pt preference to get more extension)   R Heel slides 30x5" hold with strap and slide board   R SAQ 30x 5" hold 1#       Patient received therapeutic activities for 8 minutes for improved tolerance to functional activities including:  Upright bike seat 6 full rev 5 min   Recumbent bike level 0.0 x10 minutes at setting 13 for improved joint lubrication and AROM; at end of session  Step ups on 6" step 3x10 repetitions  Step downs on 6" step 3x10 repetitions   Squats with B UE support 3x10 repetitions  Step up/down on bottom step 3x10 reps               PATIENT EDUCATION AND HOME EXERCISES     Home Exercises Provided and Patient Education Provided     Education provided:   Prone hangs 3 x per day    Written Home Exercises Provided: prone hangs.  Exercises were reviewed and Diallo was able to demonstrate them prior to the end of the session.  Diallo demonstrated good  understanding of the education provided. See EMR under Patient Instructions for exercises provided during therapy sessions    ASSESSMENT     Pt cont making progress with rom and decreased pain. Improved STS, TUG, FOTO scores.     Diallo Is progressing well towards his goals.   Pt prognosis is Good.     Pt will continue to benefit from skilled outpatient physical therapy to address the deficits listed in the problem list box on initial evaluation, provide pt/family education and to maximize pt's level of independence in the home and community environment.     Pt's spiritual, cultural and educational needs considered and pt agreeable to plan of care and goals.     Anticipated barriers to physical therapy: None    Goals:   Short Term Goals (4 Weeks):  1. Patient will be compliant with home exercise program to supplement " therapy in restoring pain free function. (progressing, not met)    2. Patient will improve impaired lower extremity manual muscle tests to >/= 4/5 to improve dynamic hip/knee support for functional tasks. (progressing, not met)    3. Pt will tolerate Standing for 30 minutes with no increase in right knee pain to return to PLOF. (progressing, not met)    4. Pt will improve right knee pain to 4/10 at worst for improved QOL. ( met)          Long Term Goals (8 Weeks):  1. Patient will improve FOTO score to </= 33% limited to decrease perceived limitation with mobility. (progressing, not met)    2. Patient will improve impaired lower extremity manual muscle tests to >/= 4+/5 to improve dynamic hip/knee support for functional tasks. (progressing, not met)    3. Patient will tolerate walking for 30 minutes with no increase in right knee pain to return to PLOF. (progressing, not met)    4. Patient will perform all work tasks and recreational activity without increased right knee pain.   (progressing, not met)        PLAN   Plan of care Certification: 5/16/2023 to 8/16/23     Outpatient Physical Therapy 3 times weekly for 6 weeks to include the following interventions: Therapeutic Exercises, Manual Therapeutic Technique, Neuromuscular Re Education, Therapeutic Activities. Modalities, Kinesiotape prn, and Functional Dry Needling as needed.      Jenna Kim, PT

## 2023-05-31 ENCOUNTER — CLINICAL SUPPORT (OUTPATIENT)
Dept: REHABILITATION | Facility: OTHER | Age: 68
End: 2023-05-31
Payer: MEDICARE

## 2023-05-31 DIAGNOSIS — M25.661 DECREASED RANGE OF MOTION (ROM) OF RIGHT KNEE: Primary | ICD-10-CM

## 2023-05-31 PROCEDURE — 97112 NEUROMUSCULAR REEDUCATION: CPT | Mod: PN | Performed by: INTERNAL MEDICINE

## 2023-05-31 PROCEDURE — 97110 THERAPEUTIC EXERCISES: CPT | Mod: PN | Performed by: INTERNAL MEDICINE

## 2023-05-31 PROCEDURE — 97530 THERAPEUTIC ACTIVITIES: CPT | Mod: PN | Performed by: INTERNAL MEDICINE

## 2023-05-31 NOTE — PROGRESS NOTES
"  [facility]   Physical Therapy Treatment Note     Name: Diallo Castano  Clinic Number: [id number]    Therapy Diagnosis:   Encounter Diagnosis   Name Primary?    Decreased range of motion (ROM) of right knee Yes       Physician: Lowell Woodson MD    Visit Date: 2023    Physician Orders: PT Evaluate and Treat  Medical Diagnosis:   M25.561,G89.29 (ICD-10-CM) - Chronic pain of right knee   M17.11 (ICD-10-CM) - Primary osteoarthritis of right knee     Evaluation Date: 23  Authorization Period Expiration: 5/15/24  Plan of Care Certification Period: 5/15/24- 7/15/24  Progress Note Due: 6/15/24    Visit # / Visits authorized:    FOTO: 2/ 3       Precautions: Standard    Time In 11  a  Time Out: 12 pm   Total Billable Time: 50  minutes      SUBJECTIVE     Pt reports: knee feels great today.  He was compliant with home exercise program.  Response to previous treatment: no c/o  Functional change: [facility]    Pain: 10  Location: right knee      OBJECTIVE    Sit to stand 30 sec : 17.5   Tug 8 sec      112  supine  arom flex R , ext - 6 prone  Ext L + 8  prone    2023    +5x Sit <> Stand: 19"  +TU"    23:  R Knee Flexion: 110 degrees  R Knee Extension: 0 degrees     FOTO        TREATMENT     Total Treatment time (time-based codes) separate from Evaluation: 55 minutes     Diallo received the treatments listed below:      therapeutic exercises to develop strength, endurance, ROM, flexibility, and core stabilization for 28  minutes including:  +U HR 15x   R SLR  3 x 10 repetitions  R SL hip abduction 2x15 repetitions 1 #  Prone hip extension 3x10   Prone knee flex strap 2 min   + LAQ 3 x 10  Calf stretch on wedge 90 sec hold  Heel raises on 6 inch step  x 30 reps  Prone knee hang 5 min  2#    Manual therapy to improve rom and decrease pain x 3 min :   Sup/inf and M/L glides to R patella x 2 min   Prom knee flex 20 sec x 3     Patient received neuromuscular " "reeducation for  21 minutes for improved proprioception and balance including:  Side steps 40 ft x 2   Hesitation walk 40 ft x 2   + ankle flip 40 ft x 2   +March on foam 90 sec   R Quad Set with 5" hold 30x with R heel on L toes (pt preference to get more extension)   R Heel slides 30x5" hold with strap and slide board   R SAQ 30x 5" hold 1#       Patient received therapeutic activities for 8 minutes for improved tolerance to functional activities including:  Upright bike seat 6 full rev 5 min   Recumbent bike level 0.0 x10 minutes at setting 13 for improved joint lubrication and AROM; at end of session  Step ups on 6" step 3x10 repetitions  Step downs on 6" step 3x10 repetitions   Squats with B UE support 3x10 repetitions  Step up/down on bottom step 3x10 reps  Lat step ups 4 in 3 x 10                PATIENT EDUCATION AND HOME EXERCISES     Home Exercises Provided and Patient Education Provided     Education provided:   Prone hangs 3 x per day    Written Home Exercises Provided: prone hangs.  Exercises were reviewed and Diallo was able to demonstrate them prior to the end of the session.  Diallo demonstrated good  understanding of the education provided. See EMR under Patient Instructions for exercises provided during therapy sessions    ASSESSMENT     Pt cont making progress with rom, gait, and decreased pain.     Diallo Is progressing well towards his goals.   Pt prognosis is Good.     Pt will continue to benefit from skilled outpatient physical therapy to address the deficits listed in the problem list box on initial evaluation, provide pt/family education and to maximize pt's level of independence in the home and community environment.     Pt's spiritual, cultural and educational needs considered and pt agreeable to plan of care and goals.     Anticipated barriers to physical therapy: None    Goals:   Short Term Goals (4 Weeks):  1. Patient will be compliant with home exercise program to supplement therapy in " restoring pain free function. (  met)    2. Patient will improve impaired lower extremity manual muscle tests to >/= 4/5 to improve dynamic hip/knee support for functional tasks. (progressing, not met)    3. Pt will tolerate Standing for 30 minutes with no increase in right knee pain to return to PLOF. (progressing, not met)    4. Pt will improve right knee pain to 4/10 at worst for improved QOL. ( met)          Long Term Goals (8 Weeks):  1. Patient will improve FOTO score to </= 33% limited to decrease perceived limitation with mobility. (progressing, not met)    2. Patient will improve impaired lower extremity manual muscle tests to >/= 4+/5 to improve dynamic hip/knee support for functional tasks. (progressing, not met)    3. Patient will tolerate walking for 30 minutes with no increase in right knee pain to return to PLOF. (progressing, not met)    4. Patient will perform all work tasks and recreational activity without increased right knee pain.   (progressing, not met)        PLAN   Plan of care Certification: 5/16/2023 to 8/16/23     Outpatient Physical Therapy 3 times weekly for 6 weeks to include the following interventions: Therapeutic Exercises, Manual Therapeutic Technique, Neuromuscular Re Education, Therapeutic Activities. Modalities, Kinesiotape prn, and Functional Dry Needling as needed.      Jenna Kim, PT

## 2023-06-02 ENCOUNTER — CLINICAL SUPPORT (OUTPATIENT)
Dept: REHABILITATION | Facility: OTHER | Age: 68
End: 2023-06-02
Payer: MEDICARE

## 2023-06-02 DIAGNOSIS — M25.661 DECREASED RANGE OF MOTION (ROM) OF RIGHT KNEE: Primary | ICD-10-CM

## 2023-06-02 PROCEDURE — 97112 NEUROMUSCULAR REEDUCATION: CPT | Mod: PN

## 2023-06-02 PROCEDURE — 97110 THERAPEUTIC EXERCISES: CPT | Mod: PN

## 2023-06-02 PROCEDURE — 97530 THERAPEUTIC ACTIVITIES: CPT | Mod: PN

## 2023-06-02 NOTE — PROGRESS NOTES
"    [facility]   Physical Therapy Treatment Note     Name: Diallo Castano  Clinic Number: [id number]    Therapy Diagnosis:   Encounter Diagnosis   Name Primary?    Decreased range of motion (ROM) of right knee Yes       Physician: Lowell Woodson MD    Visit Date: 2023    Physician Orders: PT Evaluate and Treat  Medical Diagnosis:   M25.561,G89.29 (ICD-10-CM) - Chronic pain of right knee   M17.11 (ICD-10-CM) - Primary osteoarthritis of right knee     Evaluation Date: 23  Authorization Period Expiration: 5/15/24  Plan of Care Certification Period: 5/15/24- 7/15/24  Progress Note Due: 6/15/24    Visit # / Visits authorized:    FOTO: 2/ 3       Precautions: Standard    Time In 255pm  Time Out: 400pm   Total Billable Time: 60  minutes      SUBJECTIVE     Pt reports: knee feels great today. He has been giving his knee a break on the day off.    He was compliant with home exercise program.  Response to previous treatment: no c/o  Functional change: [facility]    Pain: 10  Location: right knee      OBJECTIVE    Sit to stand 30 sec : 17.5   Tug 8 sec      112  supine  arom flex R , ext - 6 prone  Ext L + 8  prone    2023    +5x Sit <> Stand: 19"  +TU"    23:  R Knee Flexion: 110 degrees  R Knee Extension: 0 degrees     FOTO        TREATMENT     Total Treatment time (time-based codes) separate from Evaluation: 55 minutes     Diallo received the treatments listed below:      therapeutic exercises to develop strength, endurance, ROM, flexibility, and core stabilization for 35  minutes including:  +U HR 15x   R SLR 2# 3 x 10 repetitions  R SL hip abduction 2x15 repetitions 2 #  Prone hip extension 3x10   Prone knee flex strap 2 min   + LAQ 2# 3 x 10  Calf stretch on wedge 90 sec hold  Heel raises on 6 inch step  x 30 reps  Prone knee hang 5 min  2#  Shuttle 65# squats 3x20  Shuttle SL heel rise 65# 2x20    Manual therapy to improve rom and decrease pain x00 min " ":   Sup/inf and M/L glides to R patella x 2 min   Prom knee flex 20 sec x 3     Patient received neuromuscular reeducation for  10 minutes for improved proprioception and balance including:  Side steps 40 ft x 2   Hesitation walk 40 ft x 2   + ankle flip 40 ft x 2   +March on foam 90 sec   R Quad Set with 5" hold 30x with R heel on L toes (pt preference to get more extension)   R Heel slides 30x5" hold with strap and slide board   R SAQ 30x 5" hold 1#  Tandem walk forward/back 10 x 10'  Hurtles forward 5 laps  Hurtles side stepping 5 laps           Patient received therapeutic activities for 8 minutes for improved tolerance to functional activities including:  Upright bike seat 6 full rev 5 min   Recumbent bike level 0.0 x10 minutes at setting 13 for improved joint lubrication and AROM; at end of session  Step ups on 6" step 3x10 repetitions  Step downs on 6" step 3x10 repetitions   Squats with B UE support 3x10 repetitions  Step up/down on bottom step 3x10 reps  Lat step ups 4 in 3 x 10                PATIENT EDUCATION AND HOME EXERCISES     Home Exercises Provided and Patient Education Provided     Education provided:   Prone hangs 3 x per day    Written Home Exercises Provided: prone hangs.  Exercises were reviewed and Diallo was able to demonstrate them prior to the end of the session.  Diallo demonstrated good  understanding of the education provided. See EMR under Patient Instructions for exercises provided during therapy sessions    ASSESSMENT     Patient continues to improve with right knee range of motion and functional activity. He required a few standing rest breaks today 2nd to SOB and muscle fatigue. He continues to be motivated during physical therapy treatments. Patient will continue to benefit from skilled physical therapy to address persisting deficits.       Diallo Is progressing well towards his goals.   Pt prognosis is Good.     Pt will continue to benefit from skilled outpatient physical therapy to " address the deficits listed in the problem list box on initial evaluation, provide pt/family education and to maximize pt's level of independence in the home and community environment.     Pt's spiritual, cultural and educational needs considered and pt agreeable to plan of care and goals.     Anticipated barriers to physical therapy: None    Goals:   Short Term Goals (4 Weeks):  1. Patient will be compliant with home exercise program to supplement therapy in restoring pain free function. (progressing, not met)    2. Patient will improve impaired lower extremity manual muscle tests to >/= 4/5 to improve dynamic hip/knee support for functional tasks. (progressing, not met)    3. Pt will tolerate Standing for 30 minutes with no increase in right knee pain to return to PLOF. (progressing, not met)    4. Pt will improve right knee pain to 4/10 at worst for improved QOL. ( met)          Long Term Goals (8 Weeks):  1. Patient will improve FOTO score to </= 33% limited to decrease perceived limitation with mobility. (progressing, not met)    2. Patient will improve impaired lower extremity manual muscle tests to >/= 4+/5 to improve dynamic hip/knee support for functional tasks. (progressing, not met)    3. Patient will tolerate walking for 30 minutes with no increase in right knee pain to return to PLOF. (progressing, not met)    4. Patient will perform all work tasks and recreational activity without increased right knee pain.   (progressing, not met)        PLAN   Plan of care Certification: 5/16/2023 to 8/16/23     Outpatient Physical Therapy 3 times weekly for 6 weeks to include the following interventions: Therapeutic Exercises, Manual Therapeutic Technique, Neuromuscular Re Education, Therapeutic Activities. Modalities, Kinesiotape prn, and Functional Dry Needling as needed.      Jenna Kim, PT

## 2023-06-05 ENCOUNTER — CLINICAL SUPPORT (OUTPATIENT)
Dept: REHABILITATION | Facility: OTHER | Age: 68
End: 2023-06-05
Payer: MEDICARE

## 2023-06-05 DIAGNOSIS — M25.661 DECREASED RANGE OF MOTION (ROM) OF RIGHT KNEE: Primary | ICD-10-CM

## 2023-06-05 PROCEDURE — 97112 NEUROMUSCULAR REEDUCATION: CPT | Mod: PN

## 2023-06-05 PROCEDURE — 97530 THERAPEUTIC ACTIVITIES: CPT | Mod: PN

## 2023-06-05 PROCEDURE — 97110 THERAPEUTIC EXERCISES: CPT | Mod: PN

## 2023-06-05 NOTE — PROGRESS NOTES
"      [facility]   Physical Therapy Treatment Note     Name: Diallo Castano  Clinic Number: [id number]    Therapy Diagnosis:   Encounter Diagnosis   Name Primary?    Decreased range of motion (ROM) of right knee Yes       Physician: Lowell Woodson MD    Visit Date: 2023    Physician Orders: PT Evaluate and Treat  Medical Diagnosis:   M25.561,G89.29 (ICD-10-CM) - Chronic pain of right knee   M17.11 (ICD-10-CM) - Primary osteoarthritis of right knee     Evaluation Date: 23  Authorization Period Expiration: 5/15/24  Plan of Care Certification Period: 5/15/24- 7/15/24  Progress Note Due: 6/15/24    Visit # / Visits authorized: 10 / 20   FOTO: 2/ 3       Precautions: Standard    Time In 255pm  Time Out: 400pm   Total Billable Time: 60  minutes      SUBJECTIVE     Pt reports: knee feels great today. He feels like the weakness in the rest of his body is holding him back.    He was compliant with home exercise program.  Response to previous treatment: no c/o  Functional change: [facility]    Pain: 4/10  Location: right knee      OBJECTIVE    Sit to stand 30 sec : 17.5   Tug 8 sec      112  supine  arom flex R , ext - 6 prone  Ext L + 8  prone    2023    +5x Sit <> Stand: 19"  +TU"    23:  R Knee Flexion: 110 degrees  R Knee Extension: 0 degrees     FOTO        TREATMENT     Total Treatment time (time-based codes) separate from Evaluation: 55 minutes     Diallo received the treatments listed below:      therapeutic exercises to develop strength, endurance, ROM, flexibility, and core stabilization for 35  minutes including:  +U HR 15x   R SLR 2# 3 x 10 repetitions  R SL hip abduction 2x15 repetitions 2 #  Prone hip extension 3x10   Prone knee flex strap 2 min   + LAQ 2# 3 x 10  Calf stretch on wedge 90 sec hold  Heel raises on 6 inch step  x 30 reps  Prone knee hang 5 min  2#  Shuttle 75# squats 3x20  Shuttle SL heel rise 75# 2x20    Manual therapy to improve rom " "and decrease pain x00 min :   Sup/inf and M/L glides to R patella x 2 min   Prom knee flex 20 sec x 3     Patient received neuromuscular reeducation for  10 minutes for improved proprioception and balance including:  Side steps 40 ft x 2   Hesitation walk 40 ft x 2   + ankle flip 40 ft x 2   +March on foam 90 sec   R Quad Set with 5" hold 30x with R heel on L toes (pt preference to get more extension)   R Heel slides 30x5" hold with strap and slide board   R SAQ 30x 5" hold 1#  Tandem walk forward/back 10 x 10'  Hurtles forward 5 laps  Hurtles side stepping 5 laps        Patient received therapeutic activities for 10 minutes for improved tolerance to functional activities including:  Upright bike seat 6 full rev 8 min   Recumbent bike level 0.0 x10 minutes at setting 13 for improved joint lubrication and AROM; at end of session  Step ups on 6" step 3x10 repetitions  Step downs on 6" step 3x10 repetitions   Squats with B UE support 3x10 repetitions  Step up/down on bottom step 3x10 reps  Lat step ups 4 in 3 x 10                PATIENT EDUCATION AND HOME EXERCISES     Home Exercises Provided and Patient Education Provided     Education provided:   Prone hangs 3 x per day    Written Home Exercises Provided: prone hangs.  Exercises were reviewed and Diallo was able to demonstrate them prior to the end of the session.  Diallo demonstrated good  understanding of the education provided. See EMR under Patient Instructions for exercises provided during therapy sessions    ASSESSMENT     Patient continues to improve with right knee range of motion and functional activity. Patient limited by general deconditioning.  Patient will continue to benefit from skilled physical therapy to address persisting deficits.       Diallo Is progressing well towards his goals.   Pt prognosis is Good.     Pt will continue to benefit from skilled outpatient physical therapy to address the deficits listed in the problem list box on initial " evaluation, provide pt/family education and to maximize pt's level of independence in the home and community environment.     Pt's spiritual, cultural and educational needs considered and pt agreeable to plan of care and goals.     Anticipated barriers to physical therapy: None    Goals:   Short Term Goals (4 Weeks):  1. Patient will be compliant with home exercise program to supplement therapy in restoring pain free function. (progressing, not met)    2. Patient will improve impaired lower extremity manual muscle tests to >/= 4/5 to improve dynamic hip/knee support for functional tasks. (progressing, not met)    3. Pt will tolerate Standing for 30 minutes with no increase in right knee pain to return to PLOF. (progressing, not met)    4. Pt will improve right knee pain to 4/10 at worst for improved QOL. ( met)          Long Term Goals (8 Weeks):  1. Patient will improve FOTO score to </= 33% limited to decrease perceived limitation with mobility. (progressing, not met)    2. Patient will improve impaired lower extremity manual muscle tests to >/= 4+/5 to improve dynamic hip/knee support for functional tasks. (progressing, not met)    3. Patient will tolerate walking for 30 minutes with no increase in right knee pain to return to PLOF. (progressing, not met)    4. Patient will perform all work tasks and recreational activity without increased right knee pain.   (progressing, not met)        PLAN   Plan of care Certification: 5/16/2023 to 8/16/23     Outpatient Physical Therapy 3 times weekly for 6 weeks to include the following interventions: Therapeutic Exercises, Manual Therapeutic Technique, Neuromuscular Re Education, Therapeutic Activities. Modalities, Kinesiotape prn, and Functional Dry Needling as needed.      Jenna Kim, PT

## 2023-06-07 ENCOUNTER — CLINICAL SUPPORT (OUTPATIENT)
Dept: REHABILITATION | Facility: OTHER | Age: 68
End: 2023-06-07
Payer: MEDICARE

## 2023-06-07 DIAGNOSIS — M25.661 DECREASED RANGE OF MOTION (ROM) OF RIGHT KNEE: Primary | ICD-10-CM

## 2023-06-07 PROCEDURE — 97530 THERAPEUTIC ACTIVITIES: CPT | Mod: PN

## 2023-06-07 PROCEDURE — 97110 THERAPEUTIC EXERCISES: CPT | Mod: PN

## 2023-06-07 PROCEDURE — 97112 NEUROMUSCULAR REEDUCATION: CPT | Mod: PN

## 2023-06-07 NOTE — PROGRESS NOTES
"      [facility]   Physical Therapy Treatment Note     Name: Diallo Castano  Clinic Number: [id number]    Therapy Diagnosis:   Encounter Diagnosis   Name Primary?    Decreased range of motion (ROM) of right knee Yes       Physician: Lowell Woodson MD    Visit Date: 2023    Physician Orders: PT Evaluate and Treat  Medical Diagnosis:   M25.561,G89.29 (ICD-10-CM) - Chronic pain of right knee   M17.11 (ICD-10-CM) - Primary osteoarthritis of right knee     Evaluation Date: 23  Authorization Period Expiration: 5/15/24  Plan of Care Certification Period: 5/15/24- 7/15/24  Progress Note Due: 6/15/24    Visit # / Visits authorized:    FOTO: 2/ 3       Precautions: Standard    Time In 0200pm  Time Out: 0300pm  Total Billable Time: 60  minutes      SUBJECTIVE     Pt reports: that he is doing well today. Pt states that he has no complaints and is experiencing improved knee range of motion.    He was compliant with home exercise program.  Response to previous treatment: no c/o  Functional change: MI Amb    Pain: 10  Location: right knee      OBJECTIVE   23:     R knee flexion: 124 degrees  R knee extension: 0 degrees    2023    +5x Sit <> Stand: 19"  +TU"    23:  R Knee Flexion: 110 degrees  R Knee Extension: 0 degrees     FOTO        TREATMENT     Total Treatment time (time-based codes) separate from Evaluation: 55 minutes     Diallo received the treatments listed below:      therapeutic exercises to develop strength, endurance, ROM, flexibility, and core stabilization for 35  minutes including:  U HR 15x   R SLR 2# 3 x 10 repetitions  R SL hip abduction 2x15 repetitions 2 #  Prone hip extension 3x10   Prone knee flex strap 2 min    LAQ 2# 3 x 10  Calf stretch on wedge 90 sec hold  Heel raises on 6 inch step  x 30 reps  Prone knee hang 5 min  2#  Shuttle 75# squats 3x20  Shuttle SL heel rise 75# 2x20    Manual therapy to improve rom and decrease pain x00 min : " "  Sup/inf and M/L glides to R patella x 2 min   Prom knee flex 20 sec x 3     Patient received neuromuscular reeducation for  10 minutes for improved proprioception and balance including:  Side steps 40 ft x 2   Hesitation walk 40 ft x 2   + ankle flip 40 ft x 2   +March on foam 90 sec   R Quad Set with 5" hold 30x with R heel on L toes (pt preference to get more extension)   R Heel slides 30x5" hold with strap and slide board   R SAQ 30x 5" hold 1#  Tandem walk forward/back 10 x 10'  Hurtles forward 5 laps  Hurtles side stepping 5 laps        Patient received therapeutic activities for 10 minutes for improved tolerance to functional activities including:  Upright bike seat 6 full rev 8 min   Recumbent bike level 0.0 x10 minutes at setting 13 for improved joint lubrication and AROM; at end of session  Step ups on 6" step 3x10 repetitions  Step downs on 6" step 3x10 repetitions   Squats with B UE support 3x10 repetitions  Step up/down on bottom step 3x10 reps  Lat step ups 4 in 3 x 10                PATIENT EDUCATION AND HOME EXERCISES     Home Exercises Provided and Patient Education Provided     Education provided:   Prone hangs 3 x per day    Written Home Exercises Provided: prone hangs.  Exercises were reviewed and Diallo was able to demonstrate them prior to the end of the session.  iDallo demonstrated good  understanding of the education provided. See EMR under Patient Instructions for exercises provided during therapy sessions    ASSESSMENT     Pt tolerated treatment session well today and continues to progress with therapy treatment. Pt's right knee flexion AROM has improved greatly. Continue PT POC.       Diallo Is progressing well towards his goals.   Pt prognosis is Good.     Pt will continue to benefit from skilled outpatient physical therapy to address the deficits listed in the problem list box on initial evaluation, provide pt/family education and to maximize pt's level of independence in the home and " community environment.     Pt's spiritual, cultural and educational needs considered and pt agreeable to plan of care and goals.     Anticipated barriers to physical therapy: None    Goals:   Short Term Goals (4 Weeks):  1. Patient will be compliant with home exercise program to supplement therapy in restoring pain free function. (progressing, not met)    2. Patient will improve impaired lower extremity manual muscle tests to >/= 4/5 to improve dynamic hip/knee support for functional tasks. (progressing, not met)    3. Pt will tolerate Standing for 30 minutes with no increase in right knee pain to return to PLOF. (progressing, not met)    4. Pt will improve right knee pain to 4/10 at worst for improved QOL. ( met)          Long Term Goals (8 Weeks):  1. Patient will improve FOTO score to </= 33% limited to decrease perceived limitation with mobility. (progressing, not met)    2. Patient will improve impaired lower extremity manual muscle tests to >/= 4+/5 to improve dynamic hip/knee support for functional tasks. (progressing, not met)    3. Patient will tolerate walking for 30 minutes with no increase in right knee pain to return to PLOF. (progressing, not met)    4. Patient will perform all work tasks and recreational activity without increased right knee pain.   (progressing, not met)        PLAN   Plan of care Certification: 5/16/2023 to 8/16/23     Outpatient Physical Therapy 3 times weekly for 6 weeks to include the following interventions: Therapeutic Exercises, Manual Therapeutic Technique, Neuromuscular Re Education, Therapeutic Activities. Modalities, Kinesiotape prn, and Functional Dry Needling as needed.      Jenna Kim, PT

## 2023-06-08 ENCOUNTER — CLINICAL SUPPORT (OUTPATIENT)
Dept: REHABILITATION | Facility: OTHER | Age: 68
End: 2023-06-08
Payer: MEDICARE

## 2023-06-08 DIAGNOSIS — M25.661 DECREASED RANGE OF MOTION (ROM) OF RIGHT KNEE: Primary | ICD-10-CM

## 2023-06-08 PROCEDURE — 97112 NEUROMUSCULAR REEDUCATION: CPT | Mod: PN | Performed by: INTERNAL MEDICINE

## 2023-06-08 PROCEDURE — 97110 THERAPEUTIC EXERCISES: CPT | Mod: PN | Performed by: INTERNAL MEDICINE

## 2023-06-08 PROCEDURE — 97530 THERAPEUTIC ACTIVITIES: CPT | Mod: PN | Performed by: INTERNAL MEDICINE

## 2023-06-11 NOTE — PLAN OF CARE
" Please sign and return plan of care. Thank you for this referral to the [facility] clinic.        [facility]   Physical Therapy progress Note     Name: Diallo Castano  Clinic Number: [id number]    Therapy Diagnosis:   Encounter Diagnosis   Name Primary?    Decreased range of motion (ROM) of right knee Yes       Physician: Lowell Woodson MD    Visit Date: 2023    Physician Orders: PT Evaluate and Treat  Medical Diagnosis:   M25.561,G89.29 (ICD-10-CM) - Chronic pain of right knee   M17.11 (ICD-10-CM) - Primary osteoarthritis of right knee     Evaluation Date: 23  Authorization Period Expiration: 5/15/24  Plan of Care Certification Period: 5/15/24- 7/15/24  Progress Note Due: 6/15/24    Visit # / Visits authorized:    FOTO: 2/ 3       Precautions: Standard    Time In 145 pm   Time Out: 230 pm   Total Billable Time: 45   minutes      SUBJECTIVE     Pt reports: R thigh is sore from yesterday's session and has been sore this week.    He was compliant with home exercise program.  Response to previous treatment: no c/o  Functional change: he can stand/ amb 30 min now    Pain: /10  Location: right knee      OBJECTIVE     Microfet quads  (without belt)  L 48   R 31, 34, 32  69%    2023 MMT   Hip abd B 5  Hip ext B 5    2023  R knee flexion: 124 degrees  R knee extension: 0 degrees      2023  15.5 sit to stand   TUG 6 sec       2023    +5x Sit <> Stand: 19"  +TU"    23:  R Knee Flexion: 110 degrees  R Knee Extension: 0 degrees     FOTO             TREATMENT         Diallo received the treatments listed below:      therapeutic exercises to develop strength, endurance, ROM, flexibility, and core stabilization for 18  minutes including:  U HR 15x 2    R SLR 2# 3 x 10 repetitions  R SL hip abduction 2x15 repetitions 2 #  Prone hip extension 3x10   Prone knee flex strap 2 min    LAQ 3# 3 x 10  Calf stretch on wedge 90 sec " "hold  Heel raises on 6 inch step  x 30 reps  Prone knee hang 5 min  3#  Shuttle 75# squats 3x20  + bridges 15x   Shuttle SL heel rise 75# 2x20    Manual therapy to improve rom and decrease pain x00 min :   Sup/inf and M/L glides to R patella x 2 min   Prom knee flex 20 sec x 3     Patient received neuromuscular reeducation for  18 minutes for improved proprioception and balance including:  Side steps 40 ft x 2   Hesitation walk 40 ft x 2   + ankle flip 40 ft x 2   March on foam 90 sec    R SAQ 30x 5" hold 1#  Tandem walk forward/back 10 x 10'  Hurdles forward 5 laps  Hurdles side stepping 5 laps        Patient received therapeutic activities for 9  minutes for improved tolerance to functional activities including:  Upright bike seat 6 full rev 5 min   Recumbent bike level 0.0 x10 minutes at setting 13 for improved joint lubrication and AROM; at end of session  Step ups on 6" step 3x10 repetitions  Step downs on 6" step 3x10 repetitions   Squats with B UE support 3x10 repetitions  Step up/down on bottom step 3x10 reps  Lat step ups 4 in 3 x 10        PATIENT EDUCATION AND HOME EXERCISES     Home Exercises Provided and Patient Education Provided     Education provided:   Prone hangs 3 x per day    Written Home Exercises Provided: none.  Exercises were reviewed and Diallo was able to demonstrate them prior to the end of the session.  Diallo demonstrated good  understanding of the education provided. See EMR under Patient Instructions for exercises provided during therapy sessions    ASSESSMENT     Cont improved sit to stand and TUG scores. Good rom.     Diallo Is progressing well towards his goals.   Pt prognosis is Good.     Pt will continue to benefit from skilled outpatient physical therapy to address the deficits listed in the problem list box on initial evaluation, provide pt/family education and to maximize pt's level of independence in the home and community environment.     Pt's spiritual, cultural and educational " needs considered and pt agreeable to plan of care and goals.     Anticipated barriers to physical therapy: None    Goals:   Short Term Goals (4 Weeks):  1. Patient will be compliant with home exercise program to supplement therapy in restoring pain free function. ( met)    2. Patient will improve impaired lower extremity manual muscle tests to >/= 4/5 to improve dynamic hip/knee support for functional tasks. (met)    3. Pt will tolerate Standing for 30 minutes with no increase in right knee pain to return to PLOF.   Met   4. Pt will improve right knee pain to 4/10 at worst for improved QOL. ( met)          Long Term Goals (8 Weeks):  1. Patient will improve FOTO score to </= 33% limited to decrease perceived limitation with mobility. (progressing, not met)    2. Patient will improve impaired lower extremity manual muscle tests to >/= 4+/5 to improve dynamic hip/knee support for functional tasks. (progressing, not met)    3. Patient will tolerate walking for 30 minutes with no increase in right knee pain to return to PLOF. (met)    4. Patient will perform all work tasks and recreational activity without increased right knee pain.   (progressing, not met)        PLAN   Plan of care Certification: 5/16/2023 to 8/16/23     Outpatient Physical Therapy 3 times weekly for 2 weeks to include the following interventions: Therapeutic Exercises, Manual Therapeutic Technique, Neuromuscular Re Education, Therapeutic Activities. Modalities, Kinesiotape prn, and Functional Dry Needling as needed.      Jenna Kim, PT      I certify the need for these services furnished under this plan of treatment and while under my care    ____________________________________  Physician/Referring Practitioner    _______________  Date of Signature

## 2023-06-15 ENCOUNTER — CLINICAL SUPPORT (OUTPATIENT)
Dept: REHABILITATION | Facility: OTHER | Age: 68
End: 2023-06-15
Payer: MEDICARE

## 2023-06-15 DIAGNOSIS — M25.661 DECREASED RANGE OF MOTION (ROM) OF RIGHT KNEE: Primary | ICD-10-CM

## 2023-06-15 PROCEDURE — 97112 NEUROMUSCULAR REEDUCATION: CPT | Mod: PN,CQ

## 2023-06-15 PROCEDURE — 97530 THERAPEUTIC ACTIVITIES: CPT | Mod: PN,CQ

## 2023-06-15 PROCEDURE — 97110 THERAPEUTIC EXERCISES: CPT | Mod: PN,CQ

## 2023-06-15 NOTE — PROGRESS NOTES
"[facility]   Physical Therapy Daily Treatment Note     Name: Diallo Castano  Clinic Number: [id number]     Therapy Diagnosis:        Encounter Diagnosis   Name Primary?    Decreased range of motion (ROM) of right knee Yes         Physician: Lowell Woodson MD     Visit Date: 06/15/2023       Physician Orders: PT Evaluate and Treat  Medical Diagnosis:   M25.561,G89.29 (ICD-10-CM) - Chronic pain of right knee   M17.11 (ICD-10-CM) - Primary osteoarthritis of right knee      Evaluation Date: 23  Authorization Period Expiration: 5/15/24  Plan of Care Certification Period: 5/15/24- 7/15/24  Progress Note Due: 6/15/24    Visit # / Visits authorized:    FOTO: 2/ 3         Precautions: Standard     Time In: 0956  Time Out: 1056  Total Billable Time: 60 minutes        SUBJECTIVE      Pt reports: he is feeling "great" today. States he is not having any pain in his knee or hip and he feels like he is making great progress in PT.      He was compliant with home exercise program.  Response to previous treatment: felt great  Functional change: he can stand/ amb 30 min now     Pain: 0/10  Location: right knee       OBJECTIVE      Microfet quads  (without belt)  L 48   R 31, 34, 32  69%     2023 MMT   Hip abd B 5  Hip ext B 5     2023  R knee flexion: 124 degrees  R knee extension: 0 degrees        2023  15.5 sit to stand   TUG 6 sec        2023     +5x Sit <> Stand: 19"  +TU"     23:  R Knee Flexion: 110 degrees  R Knee Extension: 0 degrees      FOTO               TREATMENT            Diallo received the treatments listed below:       therapeutic exercises to develop strength, endurance, ROM, flexibility, and core stabilization for 32 minutes including:  Upright bike seat 6 full revolutions, lvl 3.0 x 6 min - for strengthen/endurance and ROM  U HR 15x 2   +Knee flexion stretch at stairs, 10x10"   R SLR 2# 3 x 10 repetitions  R SL hip abduction 2x15 repetitions " "2 #  Prone hip extension 3x10   Prone knee flex strap 2 min    LAQ 3# 3 x 10  Calf stretch on wedge 2'  Heel raises on 6 inch step  x 30 reps  Prone knee hang 5 min  3#  Shuttle 75# squats 3x20  +Straight leg bridges 20x   Shuttle SL heel rise 75# 2x20     Manual therapy to improve rom and decrease pain x00 min :   Sup/inf and M/L glides to R patella x 2 min   Prom knee flex 20 sec x 3      Patient received neuromuscular reeducation for 20 minutes for improved proprioception and balance including:  Side steps 40 ft x 2   Hesitation marching, 80ft x2 + 10# KB  + ankle flip 40 ft x 2   March on foam 90 sec    R SAQ 30x 5" hold 1#  Tandem walk forward/back 10 x 10'  Hurdles forward 5 laps  Hurdles side stepping 5 laps           Patient received therapeutic activities for 8 minutes for improved tolerance to functional activities including:    Step ups on 6" step 3x10 repetitions  +Sit to stand + 10# KB,  3x10  Step downs on 6" step 3x10 repetitions   Squats with B UE support 3x10 repetitions  Step up/down on bottom step 3x10 reps  Lat step ups 4 in 3 x 10         PATIENT EDUCATION AND HOME EXERCISES      Home Exercises Provided and Patient Education Provided      Education provided:   Prone hangs 3 x per day     Written Home Exercises Provided: none.  Exercises were reviewed and Diallo was able to demonstrate them prior to the end of the session.  Diallo demonstrated good  understanding of the education provided. See EMR under Patient Instructions for exercises provided during therapy sessions     ASSESSMENT      Diallo tolerated TX well and appears to be making excellent progress in PT. Muscle strength in quads and glutes appear to be improving. He demonstrated improved eccentric control with functional step ups and sit to stand today. ROM is also improving with both knee flexion and extension.     Diallo Is progressing well towards his goals.   Pt prognosis is Good.      Pt will continue to benefit from skilled outpatient " physical therapy to address the deficits listed in the problem list box on initial evaluation, provide pt/family education and to maximize pt's level of independence in the home and community environment.      Pt's spiritual, cultural and educational needs considered and pt agreeable to plan of care and goals.     Anticipated barriers to physical therapy: None     Goals:   Short Term Goals (4 Weeks):  1. Patient will be compliant with home exercise program to supplement therapy in restoring pain free function. ( met)    2. Patient will improve impaired lower extremity manual muscle tests to >/= 4/5 to improve dynamic hip/knee support for functional tasks. (met)    3. Pt will tolerate Standing for 30 minutes with no increase in right knee pain to return to PLOF.   Met   4. Pt will improve right knee pain to 4/10 at worst for improved QOL. ( met)          Long Term Goals (8 Weeks):  1. Patient will improve FOTO score to </= 33% limited to decrease perceived limitation with mobility. (progressing, not met)    2. Patient will improve impaired lower extremity manual muscle tests to >/= 4+/5 to improve dynamic hip/knee support for functional tasks. (progressing, not met)    3. Patient will tolerate walking for 30 minutes with no increase in right knee pain to return to PLOF. (met)    4. Patient will perform all work tasks and recreational activity without increased right knee pain.   (progressing, not met)          PLAN   Plan of care Certification: 5/16/2023 to 8/16/23    Focus on LE strength and ROM with emphasis on functional performance.     Outpatient Physical Therapy 3 times weekly for 2 weeks to include the following interventions: Therapeutic Exercises, Manual Therapeutic Technique, Neuromuscular Re Education, Therapeutic Activities. Modalities, Kinesiotape prn, and Functional Dry Needling as needed.    Shravan Hernandez, PTA

## 2023-06-16 ENCOUNTER — CLINICAL SUPPORT (OUTPATIENT)
Dept: REHABILITATION | Facility: OTHER | Age: 68
End: 2023-06-16
Payer: MEDICARE

## 2023-06-16 DIAGNOSIS — M25.661 DECREASED RANGE OF MOTION (ROM) OF RIGHT KNEE: Primary | ICD-10-CM

## 2023-06-16 PROCEDURE — 97110 THERAPEUTIC EXERCISES: CPT | Mod: PN

## 2023-06-16 PROCEDURE — 97112 NEUROMUSCULAR REEDUCATION: CPT | Mod: PN

## 2023-06-16 NOTE — PROGRESS NOTES
"[facility]   Physical Therapy Daily Treatment Note     Name: Diallo Castano  Clinic Number: [id number]     Therapy Diagnosis:        Encounter Diagnosis   Name Primary?    Decreased range of motion (ROM) of right knee Yes         Physician: Lowell Woodson MD     Visit Date: 2023       Physician Orders: PT Evaluate and Treat  Medical Diagnosis:   M25.561,G89.29 (ICD-10-CM) - Chronic pain of right knee   M17.11 (ICD-10-CM) - Primary osteoarthritis of right knee      Evaluation Date: 23  Authorization Period Expiration: 5/15/24  Plan of Care Certification Period: 5/15/24- 7/15/24  Progress Note Due: 6/15/24    Visit # / Visits authorized:    FOTO: 2/ 3         Precautions: Standard     Time In: 100p  Time Out: 150p  Total Billable Time: 50 minutes        SUBJECTIVE      Pt reports: he is feeling "great" today. States he is not having any pain in his knee or hip and he feels like he is making great progress in PT.      He was compliant with home exercise program.  Response to previous treatment: felt great  Functional change: he can stand/ amb 30 min now     Pain: 0/10  Location: right knee       OBJECTIVE      Microfet quads  (without belt)  L 48   R 31, 34, 32  69%     2023 MMT   Hip abd B 5  Hip ext B 5     2023  R knee flexion: 124 degrees  R knee extension: 0 degrees        2023  15.5 sit to stand   TUG 6 sec        2023     +5x Sit <> Stand: 19"  +TU"     23:  R Knee Flexion: 110 degrees  R Knee Extension: 0 degrees      FOTO               TREATMENT            Diallo received the treatments listed below:       therapeutic exercises to develop strength, endurance, ROM, flexibility, and core stabilization for 32 minutes including:  Upright bike seat 6 full revolutions, lvl 3.0 x 6 min - for strengthen/endurance and ROM  U HR 15x 2   +Knee flexion stretch at stairs, 10x10"   R SLR 2# 3 x 10 repetitions  R SL hip abduction 2x15 repetitions " "2 #  Prone hip extension 3x10   Prone knee flex strap 2 min    LAQ 3# 3 x 10  Calf stretch on wedge 2'  Heel raises on 6 inch step  x 30 reps  Prone knee hang 5 min  3#  Shuttle 75# squats 3x20  +Straight leg bridges 20x   Shuttle SL heel rise 75# 2x20     Manual therapy to improve rom and decrease pain x00 min :   Sup/inf and M/L glides to R patella x 2 min   Prom knee flex 20 sec x 3      Patient received neuromuscular reeducation for 20 minutes for improved proprioception and balance including:  Side steps 40 ft x 2   Hesitation marching, 80ft x2 + 10# KB  + ankle flip 40 ft x 2   March on foam 90 sec    R SAQ 30x 5" hold 1#  Tandem walk forward/back 10 x 10'  Hurdles forward 5 laps  Hurdles side stepping 5 laps           Patient received therapeutic activities for 8 minutes for improved tolerance to functional activities including:    Step ups on 6" step 3x10 repetitions  +Sit to stand + 10# KB,  3x10  Step downs on 6" step 3x10 repetitions   Squats with B UE support 3x10 repetitions  Step up/down on bottom step 3x10 reps  Lat step ups 4 in 3 x 10         PATIENT EDUCATION AND HOME EXERCISES      Home Exercises Provided and Patient Education Provided      Education provided:   Prone hangs 3 x per day     Written Home Exercises Provided: none.  Exercises were reviewed and Diallo was able to demonstrate them prior to the end of the session.  Diallo demonstrated good  understanding of the education provided. See EMR under Patient Instructions for exercises provided during therapy sessions     ASSESSMENT      Diallo tolerated exercises well and is making progress in range of motion. He did not experience any pain when performing the exercises. His range of motion is improving in the exercises as well.     Diallo Is progressing well towards his goals.   Pt prognosis is Good.      Pt will continue to benefit from skilled outpatient physical therapy to address the deficits listed in the problem list box on initial " evaluation, provide pt/family education and to maximize pt's level of independence in the home and community environment.      Pt's spiritual, cultural and educational needs considered and pt agreeable to plan of care and goals.     Anticipated barriers to physical therapy: None     Goals:   Short Term Goals (4 Weeks):  1. Patient will be compliant with home exercise program to supplement therapy in restoring pain free function. ( met)    2. Patient will improve impaired lower extremity manual muscle tests to >/= 4/5 to improve dynamic hip/knee support for functional tasks. (met)    3. Pt will tolerate Standing for 30 minutes with no increase in right knee pain to return to PLOF.   Met   4. Pt will improve right knee pain to 4/10 at worst for improved QOL. ( met)          Long Term Goals (8 Weeks):  1. Patient will improve FOTO score to </= 33% limited to decrease perceived limitation with mobility. (progressing, not met)    2. Patient will improve impaired lower extremity manual muscle tests to >/= 4+/5 to improve dynamic hip/knee support for functional tasks. (progressing, not met)    3. Patient will tolerate walking for 30 minutes with no increase in right knee pain to return to PLOF. (met)    4. Patient will perform all work tasks and recreational activity without increased right knee pain.   (progressing, not met)          PLAN   Plan of care Certification: 5/16/2023 to 8/16/23    Focus on LE strength and ROM with emphasis on functional performance.     Outpatient Physical Therapy 3 times weekly for 2 weeks to include the following interventions: Therapeutic Exercises, Manual Therapeutic Technique, Neuromuscular Re Education, Therapeutic Activities. Modalities, Kinesiotape prn, and Functional Dry Needling as needed.    Kleber Mackenzie, PT

## 2023-06-19 ENCOUNTER — CLINICAL SUPPORT (OUTPATIENT)
Dept: REHABILITATION | Facility: OTHER | Age: 68
End: 2023-06-19
Payer: MEDICARE

## 2023-06-19 DIAGNOSIS — M25.661 DECREASED RANGE OF MOTION (ROM) OF RIGHT KNEE: Primary | ICD-10-CM

## 2023-06-19 PROCEDURE — 97112 NEUROMUSCULAR REEDUCATION: CPT | Mod: PN

## 2023-06-19 PROCEDURE — 97530 THERAPEUTIC ACTIVITIES: CPT | Mod: PN

## 2023-06-19 NOTE — PLAN OF CARE
"[facility]   Physical Therapy Updated Plan of Care     Name: Diallo Castano  Clinic Number: [id number]     Therapy Diagnosis:        Encounter Diagnosis   Name Primary?    Decreased range of motion (ROM) of right knee Yes         Physician: Lowell Woodson MD     Visit Date: 2023       Physician Orders: PT Evaluate and Treat  Medical Diagnosis:   M25.561,G89.29 (ICD-10-CM) - Chronic pain of right knee   M17.11 (ICD-10-CM) - Primary osteoarthritis of right knee      Evaluation Date: 23  Authorization Period Expiration: 5/15/24  Plan of Care Certification Period: 23- 23  Progress Note Due: 23  Visit # / Visits authorized: 15 / 20   FOTO: 2/ 3         Precautions: Standard     Time In: 0200pm  Time Out: 0300pm  Total Billable Time: 60 minutes        SUBJECTIVE      Pt reports: that he is doing great today. Pt states that he is continuing to progress in his range of motion and strength.      He was compliant with home exercise program.  Response to previous treatment: felt great  Functional change: he can stand/ amb 30 min now     Pain: 0/10  Location: right knee       OBJECTIVE      Microfet quads  (without belt)  L 48   R 31, 34, 32  69%     2023 MMT   Hip abd B 5  Hip ext B 5     2023  R knee flexion: 127 degrees  R knee extension: 0 degrees        2023  15.5 sit to stand   TUG 6 sec        2023     +5x Sit <> Stand: 19"  +TU"     23:  R Knee Flexion: 110 degrees  R Knee Extension: 0 degrees      FOTO               TREATMENT            Diallo received the treatments listed below:       therapeutic exercises to develop strength, endurance, ROM, flexibility, and core stabilization for 0 minutes including:  U HR 15x 2   Knee flexion stretch at stairs, 10x10"   R SLR 2# 3 x 10 repetitions  R SL hip abduction 2x15 repetitions 2 #  Prone knee flex strap 2 min    LAQ 3# 3 x 10  Calf stretch on wedge 2'  Prone knee hang 5 min  3#     " "  Manual therapy to improve rom and decrease pain x00 min :   Sup/inf and M/L glides to R patella x 2 min   Prom knee flex 20 sec x 3      Patient received neuromuscular reeducation for 10 minutes for improved proprioception and balance including:  Side steps 40 ft x 2   Hesitation marching, 80ft x2 + 20# KB  + ankle flip 40 ft x 2   March on foam 90 sec    R SAQ 30x 5" hold 1#  Tandem walk forward/back 10 x 10'  Hurdles forward 5 laps  Hurdles side stepping 5 laps           Patient received therapeutic activities for 50 minutes for improved tolerance to functional activities including:  Upright bike seat 6 full revolutions, lvl 3.0 x 6 min - for strengthen/endurance and ROM  Step ups on 6" step 3x10 repetitions  Sit to stand + 10# KB,  3x10  Step downs on 6" step 3x10 repetitions   Squats with B UE support 3x10 repetitions  Step up/down on bottom step 3x10 reps  Lat step ups 4 in 3 x 10   Shuttle 75# squats 3x20  Straight leg bridges 20x   Shuttle SL heel rise 75# 2x20  +Updated Plan of Care        PATIENT EDUCATION AND HOME EXERCISES      Home Exercises Provided and Patient Education Provided      Education provided:   Prone hangs 3 x per day     Written Home Exercises Provided: none.  Exercises were reviewed and Diallo was able to demonstrate them prior to the end of the session.  Diallo demonstrated good  understanding of the education provided. See EMR under Patient Instructions for exercises provided during therapy sessions     ASSESSMENT      Pt presents to physical therapy treatment with decreased active range of motion, decreased strength, poor posture, and increased pain due to right knee impairments. These factors are negatively impacting the patient's ability to complete their activities of daily living and work duties. Pt is progressing towards their short and long term goals as evidenced by decreased pain, improved strength and range of motion, and improved FOTO score. These goals remain appropriate for " the plan of care. Pt would benefit from continued skilled physical therapy treatment to address these deficits so that they may return to their prior level of function with improved quality of life.      Diallo Is progressing well towards his goals.   Pt prognosis is Good.      Pt will continue to benefit from skilled outpatient physical therapy to address the deficits listed in the problem list box on initial evaluation, provide pt/family education and to maximize pt's level of independence in the home and community environment.      Pt's spiritual, cultural and educational needs considered and pt agreeable to plan of care and goals.     Anticipated barriers to physical therapy: None     Goals:   Short Term Goals (4 Weeks):  1. Patient will be compliant with home exercise program to supplement therapy in restoring pain free function. ( met)    2. Patient will improve impaired lower extremity manual muscle tests to >/= 4/5 to improve dynamic hip/knee support for functional tasks. (met)    3. Pt will tolerate Standing for 30 minutes with no increase in right knee pain to return to PLOF.   Met   4. Pt will improve right knee pain to 4/10 at worst for improved QOL. ( met)          Long Term Goals (8 Weeks):  1. Patient will improve FOTO score to </= 33% limited to decrease perceived limitation with mobility. (met)    2. Patient will improve impaired lower extremity manual muscle tests to >/= 4+/5 to improve dynamic hip/knee support for functional tasks. (progressing, not met)    3. Patient will tolerate walking for 30 minutes with no increase in right knee pain to return to PLOF. (met)    4. Patient will perform all work tasks and recreational activity without increased right knee pain.   (progressing, not met)          PLAN   Plan of care Certification: 6/19/23- 9/19/23    Focus on LE strength and ROM with emphasis on functional performance.     Outpatient Physical Therapy 3 times weekly for 2 weeks to include the  following interventions: Therapeutic Exercises, Manual Therapeutic Technique, Neuromuscular Re Education, Therapeutic Activities. Modalities, Kinesiotape prn, and Functional Dry Needling as needed.    Snow Jean, PT

## 2023-06-19 NOTE — PROGRESS NOTES
"[facility]   Physical Therapy Updated Plan of Care     Name: Diallo Castano  Clinic Number: [id number]     Therapy Diagnosis:        Encounter Diagnosis   Name Primary?    Decreased range of motion (ROM) of right knee Yes         Physician: Lowell Woodson MD     Visit Date: 2023       Physician Orders: PT Evaluate and Treat  Medical Diagnosis:   M25.561,G89.29 (ICD-10-CM) - Chronic pain of right knee   M17.11 (ICD-10-CM) - Primary osteoarthritis of right knee      Evaluation Date: 23  Authorization Period Expiration: 5/15/24  Plan of Care Certification Period: 23- 23  Progress Note Due: 23  Visit # / Visits authorized: 15 / 20   FOTO: 2/ 3         Precautions: Standard     Time In: 0200pm  Time Out: 0300pm  Total Billable Time: 60 minutes        SUBJECTIVE      Pt reports: that he is doing great today. Pt states that he is continuing to progress in his range of motion and strength.      He was compliant with home exercise program.  Response to previous treatment: felt great  Functional change: he can stand/ amb 30 min now     Pain: 0/10  Location: right knee       OBJECTIVE      Microfet quads  (without belt)  L 48   R 31, 34, 32  69%     2023 MMT   Hip abd B 5  Hip ext B 5     2023  R knee flexion: 127 degrees  R knee extension: 0 degrees        2023  15.5 sit to stand   TUG 6 sec        2023     +5x Sit <> Stand: 19"  +TU"     23:  R Knee Flexion: 110 degrees  R Knee Extension: 0 degrees      FOTO               TREATMENT            Diallo received the treatments listed below:       therapeutic exercises to develop strength, endurance, ROM, flexibility, and core stabilization for 0 minutes including:  U HR 15x 2   Knee flexion stretch at stairs, 10x10"   R SLR 2# 3 x 10 repetitions  R SL hip abduction 2x15 repetitions 2 #  Prone knee flex strap 2 min    LAQ 3# 3 x 10  Calf stretch on wedge 2'  Prone knee hang 5 min  3#     " "  Manual therapy to improve rom and decrease pain x00 min :   Sup/inf and M/L glides to R patella x 2 min   Prom knee flex 20 sec x 3      Patient received neuromuscular reeducation for 10 minutes for improved proprioception and balance including:  Side steps 40 ft x 2   Hesitation marching, 80ft x2 + 20# KB  + ankle flip 40 ft x 2   March on foam 90 sec    R SAQ 30x 5" hold 1#  Tandem walk forward/back 10 x 10'  Hurdles forward 5 laps  Hurdles side stepping 5 laps           Patient received therapeutic activities for 50 minutes for improved tolerance to functional activities including:  Upright bike seat 6 full revolutions, lvl 3.0 x 6 min - for strengthen/endurance and ROM  Step ups on 6" step 3x10 repetitions  Sit to stand + 10# KB,  3x10  Step downs on 6" step 3x10 repetitions   Squats with B UE support 3x10 repetitions  Step up/down on bottom step 3x10 reps  Lat step ups 4 in 3 x 10   Shuttle 75# squats 3x20  Straight leg bridges 20x   Shuttle SL heel rise 75# 2x20  +Updated Plan of Care        PATIENT EDUCATION AND HOME EXERCISES      Home Exercises Provided and Patient Education Provided      Education provided:   Prone hangs 3 x per day     Written Home Exercises Provided: none.  Exercises were reviewed and Diallo was able to demonstrate them prior to the end of the session.  Diallo demonstrated good  understanding of the education provided. See EMR under Patient Instructions for exercises provided during therapy sessions     ASSESSMENT      Pt presents to physical therapy treatment with decreased active range of motion, decreased strength, poor posture, and increased pain due to right knee impairments. These factors are negatively impacting the patient's ability to complete their activities of daily living and work duties. Pt is progressing towards their short and long term goals as evidenced by decreased pain, improved strength and range of motion, and improved FOTO score. These goals remain appropriate for " the plan of care. Pt would benefit from continued skilled physical therapy treatment to address these deficits so that they may return to their prior level of function with improved quality of life.      Diallo Is progressing well towards his goals.   Pt prognosis is Good.      Pt will continue to benefit from skilled outpatient physical therapy to address the deficits listed in the problem list box on initial evaluation, provide pt/family education and to maximize pt's level of independence in the home and community environment.      Pt's spiritual, cultural and educational needs considered and pt agreeable to plan of care and goals.     Anticipated barriers to physical therapy: None     Goals:   Short Term Goals (4 Weeks):  1. Patient will be compliant with home exercise program to supplement therapy in restoring pain free function. ( met)    2. Patient will improve impaired lower extremity manual muscle tests to >/= 4/5 to improve dynamic hip/knee support for functional tasks. (met)    3. Pt will tolerate Standing for 30 minutes with no increase in right knee pain to return to PLOF.   Met   4. Pt will improve right knee pain to 4/10 at worst for improved QOL. ( met)          Long Term Goals (8 Weeks):  1. Patient will improve FOTO score to </= 33% limited to decrease perceived limitation with mobility. (met)    2. Patient will improve impaired lower extremity manual muscle tests to >/= 4+/5 to improve dynamic hip/knee support for functional tasks. (progressing, not met)    3. Patient will tolerate walking for 30 minutes with no increase in right knee pain to return to PLOF. (met)    4. Patient will perform all work tasks and recreational activity without increased right knee pain.   (progressing, not met)          PLAN   Plan of care Certification: 6/19/23- 9/19/23    Focus on LE strength and ROM with emphasis on functional performance.     Outpatient Physical Therapy 3 times weekly for 2 weeks to include the  following interventions: Therapeutic Exercises, Manual Therapeutic Technique, Neuromuscular Re Education, Therapeutic Activities. Modalities, Kinesiotape prn, and Functional Dry Needling as needed.    Snow Jean, PT

## 2023-06-21 ENCOUNTER — CLINICAL SUPPORT (OUTPATIENT)
Dept: REHABILITATION | Facility: OTHER | Age: 68
End: 2023-06-21
Payer: MEDICARE

## 2023-06-21 DIAGNOSIS — M25.661 DECREASED RANGE OF MOTION (ROM) OF RIGHT KNEE: Primary | ICD-10-CM

## 2023-06-21 PROCEDURE — 97530 THERAPEUTIC ACTIVITIES: CPT | Mod: PN

## 2023-06-21 PROCEDURE — 97112 NEUROMUSCULAR REEDUCATION: CPT | Mod: PN

## 2023-06-21 NOTE — PROGRESS NOTES
"[facility]   Physical Therapy Treatment Note     Name: Diallo Castaon  Clinic Number: [id number]     Therapy Diagnosis:        Encounter Diagnosis   Name Primary?    Decreased range of motion (ROM) of right knee Yes         Physician: Lowell Woodson MD     Visit Date: 2023       Physician Orders: PT Evaluate and Treat  Medical Diagnosis:   M25.561,G89.29 (ICD-10-CM) - Chronic pain of right knee   M17.11 (ICD-10-CM) - Primary osteoarthritis of right knee      Evaluation Date: 23  Authorization Period Expiration: 5/15/24  Plan of Care Certification Period: 23- 23  Progress Note Due: 23  Visit # / Visits authorized: 15 / 20   FOTO: 2/ 3         Precautions: Standard     Time In: 1055  Time Out: 1200  Total Billable Time: 60 minutes        SUBJECTIVE      Pt reports: that he is doing great today. Pt states that he is continuing to progress in his range of motion and strength. Patient noted that he has difficulty crossing his legs.      He was compliant with home exercise program.  Response to previous treatment: felt great  Functional change: he can stand/ amb 30 min now     Pain: 0/10  Location: right knee       OBJECTIVE      Microfet quads  (without belt)  L 48   R 31, 34, 32  69%     2023 MMT   Hip abd B 5  Hip ext B 5     2023  R knee flexion: 127 degrees  R knee extension: 0 degrees        2023  15.5 sit to stand   TUG 6 sec        2023     +5x Sit <> Stand: 19"  +TU"     23:  R Knee Flexion: 110 degrees  R Knee Extension: 0 degrees      FOTO               TREATMENT            Diallo received the treatments listed below:       therapeutic exercises to develop strength, endurance, ROM, flexibility, and core stabilization for 0 minutes including:  U HR 15x 2   Knee flexion stretch at stairs, 10x10"   R SLR 2# 3 x 10 repetitions  R SL hip abduction 2x15 repetitions 2 #  Prone knee flex strap 2 min    LAQ 3# 3 x 10  Calf stretch " "on wedge 2'  Prone knee hang 5 min  3#       Manual therapy to improve rom and decrease pain x00 min :   Sup/inf and M/L glides to R patella x 2 min   Prom knee flex 20 sec x 3      Patient received neuromuscular reeducation for 10 minutes for improved proprioception and balance including:  Side steps 40 ft x 2   Hesitation marching, 80ft x2 + 20# KB  + ankle flip 40 ft x 2   March on foam 90 sec    R SAQ 30x 5" hold 1#  Tandem walk forward/back 10 x 10'  Hurdles forward 5 laps  Hurdles side stepping 5 laps  Hamstring stretch 3x30"  Prone quadriceps stretch 3x30"  Standing fire hydrants 2x10     Patient received therapeutic activities for 50 minutes for improved tolerance to functional activities including:  Upright bike seat 6 full revolutions, lvl 3.0 x 6 min - for strengthen/endurance and ROM  Step ups on 6" step 3x10 repetitions  Sit to stand + 10# KB,  3x10  Step downs on 6" step 3x10 repetitions   Squats with B UE support 3x10 repetitions  Step up/down on bottom step 3x10 reps  Lat step ups 4 in 3 x 10   Shuttle 75# squats 3x20  Straight leg bridges 20x   Shuttle SL heel rise 75# 2x20  +Updated Plan of Care        PATIENT EDUCATION AND HOME EXERCISES      Home Exercises Provided and Patient Education Provided      Education provided:   Prone hangs 3 x per day     Written Home Exercises Provided: none.  Exercises were reviewed and Diallo was able to demonstrate them prior to the end of the session.  Diallo demonstrated good  understanding of the education provided. See EMR under Patient Instructions for exercises provided during therapy sessions     ASSESSMENT    Patient tolerated exercises well. Is continuing to make improvements in range of motion and endurance exercises. Patient tolerated add stretches well.     Diallo Is progressing well towards his goals.   Pt prognosis is Good.      Pt will continue to benefit from skilled outpatient physical therapy to address the deficits listed in the problem list box on " initial evaluation, provide pt/family education and to maximize pt's level of independence in the home and community environment.      Pt's spiritual, cultural and educational needs considered and pt agreeable to plan of care and goals.     Anticipated barriers to physical therapy: None     Goals:   Short Term Goals (4 Weeks):  1. Patient will be compliant with home exercise program to supplement therapy in restoring pain free function. ( met)    2. Patient will improve impaired lower extremity manual muscle tests to >/= 4/5 to improve dynamic hip/knee support for functional tasks. (met)    3. Pt will tolerate Standing for 30 minutes with no increase in right knee pain to return to PLOF.   Met   4. Pt will improve right knee pain to 4/10 at worst for improved QOL. ( met)          Long Term Goals (8 Weeks):  1. Patient will improve FOTO score to </= 33% limited to decrease perceived limitation with mobility. (met)    2. Patient will improve impaired lower extremity manual muscle tests to >/= 4+/5 to improve dynamic hip/knee support for functional tasks. (progressing, not met)    3. Patient will tolerate walking for 30 minutes with no increase in right knee pain to return to PLOF. (met)    4. Patient will perform all work tasks and recreational activity without increased right knee pain.   (progressing, not met)          PLAN   Plan of care Certification: 6/19/23- 9/19/23    Focus on LE strength and ROM with emphasis on functional performance.     Outpatient Physical Therapy 3 times weekly for 2 weeks to include the following interventions: Therapeutic Exercises, Manual Therapeutic Technique, Neuromuscular Re Education, Therapeutic Activities. Modalities, Kinesiotape prn, and Functional Dry Needling as needed.    Kleber Mackenzie, PT

## 2023-06-23 ENCOUNTER — CLINICAL SUPPORT (OUTPATIENT)
Dept: REHABILITATION | Facility: OTHER | Age: 68
End: 2023-06-23
Payer: MEDICARE

## 2023-06-23 DIAGNOSIS — M25.661 DECREASED RANGE OF MOTION (ROM) OF RIGHT KNEE: Primary | ICD-10-CM

## 2023-06-23 PROCEDURE — 97530 THERAPEUTIC ACTIVITIES: CPT | Mod: PN

## 2023-06-23 PROCEDURE — 97112 NEUROMUSCULAR REEDUCATION: CPT | Mod: PN

## 2023-06-23 NOTE — PROGRESS NOTES
"[facility]   Physical Therapy Discharge Summary      Name: Diallo Castano  Clinic Number: [id number]     Therapy Diagnosis:        Encounter Diagnosis   Name Primary?    Decreased range of motion (ROM) of right knee Yes         Physician: Lowell Woodson MD     Visit Date: 2023       Physician Orders: PT Evaluate and Treat  Medical Diagnosis:   M25.561,G89.29 (ICD-10-CM) - Chronic pain of right knee   M17.11 (ICD-10-CM) - Primary osteoarthritis of right knee      Evaluation Date: 23  Authorization Period Expiration: 5/15/24  Plan of Care Certification Period: 23- 23  Progress Note Due: 23  Visit # / Visits authorized: 15 / 20   FOTO: 2/ 3         Precautions: Standard     Time In: 1055  Time Out: 1200  Total Billable Time: 60 minutes        SUBJECTIVE      Pt reports: Discharge patient today. Patient has partially/fully met their short and long term goals. PT educated pt on progression of home exercise program and pt demonstrated understanding. All questions/concerns were answered/addressed by PT.         He was compliant with home exercise program.  Response to previous treatment: felt great  Functional change: he can stand/ amb 30 min now     Pain: 0/10  Location: right knee       OBJECTIVE      Microfet quads  (without belt)  L 48   R 31, 34, 32  69%     2023 MMT   Hip abd B 5  Hip ext B 5     2023  R knee flexion: 127 degrees  R knee extension: 0 degrees        2023  15.5 sit to stand   TUG 6 sec        2023     +5x Sit <> Stand: 19"  +TU"     23:  R Knee Flexion: 110 degrees  R Knee Extension: 0 degrees      FOTO               TREATMENT            Diallo received the treatments listed below:       therapeutic exercises to develop strength, endurance, ROM, flexibility, and core stabilization for 0 minutes including:  U HR 15x 2   Knee flexion stretch at stairs, 10x10"   R SLR 2# 3 x 10 repetitions  R SL hip abduction 2x15 " "repetitions 2 #  Prone knee flex strap 2 min    LAQ 3# 3 x 10  Calf stretch on wedge 2'  Prone knee hang 5 min  3#       Manual therapy to improve rom and decrease pain x00 min :   Sup/inf and M/L glides to R patella x 2 min   Prom knee flex 20 sec x 3      Patient received neuromuscular reeducation for 10 minutes for improved proprioception and balance including:  Side steps 40 ft x 2   Hesitation marching, 80ft x2 + 20# KB  + ankle flip 40 ft x 2   March on foam 90 sec    R SAQ 30x 5" hold 1#  Tandem walk forward/back 10 x 10'  Hurdles forward 5 laps  Hurdles side stepping 5 laps  Hamstring stretch 3x30"  Prone quadriceps stretch 3x30"  Standing fire hydrants 2x10     Patient received therapeutic activities for 50 minutes for improved tolerance to functional activities including:  Upright bike seat 6 full revolutions, lvl 3.0 x 6 min - for strengthen/endurance and ROM  Step ups on 6" step 3x10 repetitions  Sit to stand + 10# KB,  3x10  Step downs on 6" step 3x10 repetitions   Squats with B UE support 3x10 repetitions  Step up/down on bottom step 3x10 reps  Lat step ups 4 in 3 x 10   Shuttle 75# squats 3x20  Straight leg bridges 20x   Shuttle SL heel rise 75# 2x20          PATIENT EDUCATION AND HOME EXERCISES      Home Exercises Provided and Patient Education Provided      Education provided:   Prone hangs 3 x per day     Written Home Exercises Provided: none.  Exercises were reviewed and Diallo was able to demonstrate them prior to the end of the session.  Diallo demonstrated good  understanding of the education provided. See EMR under Patient Instructions for exercises provided during therapy sessions     ASSESSMENT   Discharge patient today. Patient has partially/fully met their short and long term goals. PT educated pt on progression of home exercise program and pt demonstrated understanding. All questions/concerns were answered/addressed by PT.      Diallo Is progressing well towards his goals.   Pt prognosis is " Good.      Pt will continue to benefit from skilled outpatient physical therapy to address the deficits listed in the problem list box on initial evaluation, provide pt/family education and to maximize pt's level of independence in the home and community environment.      Pt's spiritual, cultural and educational needs considered and pt agreeable to plan of care and goals.     Anticipated barriers to physical therapy: None     Goals:   Short Term Goals (4 Weeks):  1. Patient will be compliant with home exercise program to supplement therapy in restoring pain free function. ( met)    2. Patient will improve impaired lower extremity manual muscle tests to >/= 4/5 to improve dynamic hip/knee support for functional tasks. (met)    3. Pt will tolerate Standing for 30 minutes with no increase in right knee pain to return to PLOF.   Met   4. Pt will improve right knee pain to 4/10 at worst for improved QOL. ( met)          Long Term Goals (8 Weeks):  1. Patient will improve FOTO score to </= 33% limited to decrease perceived limitation with mobility. (met)    2. Patient will improve impaired lower extremity manual muscle tests to >/= 4+/5 to improve dynamic hip/knee support for functional tasks. (progressing, not met)    3. Patient will tolerate walking for 30 minutes with no increase in right knee pain to return to PLOF. (met)    4. Patient will perform all work tasks and recreational activity without increased right knee pain.   (progressing, not met)          PLAN   Plan of care Certification: 6/19/23- 9/19/23    Discharge patient today. Patient has partially/fully met their short and long term goals. PT educated pt on progression of home exercise program and pt demonstrated understanding. All questions/concerns were answered/addressed by PT.      Snow Jean, PT

## 2023-08-31 ENCOUNTER — HOSPITAL ENCOUNTER (OUTPATIENT)
Dept: RADIOLOGY | Facility: HOSPITAL | Age: 68
Discharge: HOME | End: 2023-08-31
Attending: ORTHOPAEDIC SURGERY
Payer: MEDICARE

## 2023-08-31 ENCOUNTER — OFFICE VISIT (OUTPATIENT)
Dept: ORTHOPEDICS | Facility: CLINIC | Age: 68
End: 2023-08-31
Payer: MEDICARE

## 2023-08-31 VITALS
HEIGHT: 69 IN | HEART RATE: 69 BPM | SYSTOLIC BLOOD PRESSURE: 180 MMHG | DIASTOLIC BLOOD PRESSURE: 84 MMHG | BODY MASS INDEX: 41.96 KG/M2 | WEIGHT: 283.31 LBS

## 2023-08-31 DIAGNOSIS — Z96.651 AFTERCARE FOLLOWING RIGHT KNEE JOINT REPLACEMENT SURGERY: Primary | ICD-10-CM

## 2023-08-31 DIAGNOSIS — Z96.651 AFTERCARE FOLLOWING RIGHT KNEE JOINT REPLACEMENT SURGERY: ICD-10-CM

## 2023-08-31 DIAGNOSIS — Z47.1 AFTERCARE FOLLOWING RIGHT KNEE JOINT REPLACEMENT SURGERY: Primary | ICD-10-CM

## 2023-08-31 DIAGNOSIS — Z47.1 AFTERCARE FOLLOWING RIGHT KNEE JOINT REPLACEMENT SURGERY: ICD-10-CM

## 2023-08-31 PROCEDURE — 77073 BONE LENGTH STUDIES: CPT | Mod: 26,,, | Performed by: INTERNAL MEDICINE

## 2023-08-31 PROCEDURE — 73562 X-RAY EXAM OF KNEE 3: CPT | Mod: TC,PN,RT

## 2023-08-31 PROCEDURE — 99999 PR PBB SHADOW E&M-EST. PATIENT-LVL III: ICD-10-PCS | Mod: PBBFAC,,, | Performed by: ORTHOPAEDIC SURGERY

## 2023-08-31 PROCEDURE — 99213 OFFICE O/P EST LOW 20 MIN: CPT | Mod: PBBFAC,PN | Performed by: ORTHOPAEDIC SURGERY

## 2023-08-31 PROCEDURE — 99999 PR PBB SHADOW E&M-EST. PATIENT-LVL III: CPT | Mod: PBBFAC,,, | Performed by: ORTHOPAEDIC SURGERY

## 2023-08-31 PROCEDURE — 73562 X-RAY EXAM OF KNEE 3: CPT | Mod: 26,59,RT, | Performed by: INTERNAL MEDICINE

## 2023-08-31 PROCEDURE — 77073 BONE LENGTH STUDIES: CPT | Mod: TC,PN

## 2023-08-31 PROCEDURE — 77073 XR HIP TO ANKLE: ICD-10-PCS | Mod: 26,,, | Performed by: INTERNAL MEDICINE

## 2023-08-31 PROCEDURE — 73562 XR KNEE 3 VIEW RIGHT: ICD-10-PCS | Mod: 26,59,RT, | Performed by: INTERNAL MEDICINE

## 2023-08-31 PROCEDURE — 99214 PR OFFICE/OUTPT VISIT, EST, LEVL IV, 30-39 MIN: ICD-10-PCS | Mod: S$PBB,,, | Performed by: ORTHOPAEDIC SURGERY

## 2023-08-31 PROCEDURE — 99214 OFFICE O/P EST MOD 30 MIN: CPT | Mod: S$PBB,,, | Performed by: ORTHOPAEDIC SURGERY

## 2023-08-31 NOTE — PROGRESS NOTES
Subjective:      Patient ID: Diallo Castano is a 67 y.o. male.    Chief Complaint: Pain and Post-op Evaluation of the Right Knee    Knee Pain: left  swelling: Yes  worsens with activity: Yes  relieved by: [email or web address]    Patient is 3 months s/p  right primary total knee replacement  Anterior knee pain: No  Has improved pain  Is in physical therapy  No  Problems w incision  No  Is  happy with result  Yes  Opiod free: Yes            Social History     Occupational History    Not on file   Tobacco Use    Smoking status: Never    Smokeless tobacco: Never   Substance and Sexual Activity    Alcohol use: Yes    Drug use: Never    Sexual activity: Not on file      Review of Systems   Constitutional: Negative for diaphoresis.   HENT:  Negative for ear discharge, nosebleeds and stridor.    Eyes:  Negative for photophobia.   Cardiovascular:  Negative for syncope.   Respiratory:  Negative for hemoptysis, shortness of breath and wheezing.    Neurological:  Negative for tremors.   Psychiatric/Behavioral: Negative.           Objective:    General    Constitutional: He is oriented to person, place, and time. He appears well-developed and well-nourished.   HENT:   Head: Normocephalic and atraumatic.   Eyes: EOM are normal.   Pulmonary/Chest: Effort normal.   Neurological: He is alert and oriented to person, place, and time.   Psychiatric: He has a normal mood and affect. His behavior is normal. Judgment and thought content normal.     General Musculoskeletal Exam   Gait: normal       Right Knee Exam     Inspection   Erythema: absent  Scars: present  Swelling: absent  Effusion: absent  Deformity: absent  Bruising: absent    Range of Motion   Extension:  0   Flexion:  110     Tests   Ligament Examination   PCL-Posterior Drawer: normal (0 to 2mm)     MCL - Valgus: normal (0 to 2mm)  LCL - Varus: normal  Patella   Passive Patellar Tilt: neutral  Patellar Tracking: normal    Other   Sensation: normal    Comments:    Incision well  healed    Vascular Exam       Edema  Right Lower Leg: absent         Assessment:       1. Aftercare following right knee joint replacement surgery          Plan:       Overall patient appears to be doing well and is happy with the result of the knee arthroplasty. They can continue activities as tolerated avoiding high impact activities.  I would like to see the patient back In 3 months with xrays.

## 2023-11-14 DIAGNOSIS — Z96.651 AFTERCARE FOLLOWING RIGHT KNEE JOINT REPLACEMENT SURGERY: ICD-10-CM

## 2023-11-14 DIAGNOSIS — Z47.1 AFTERCARE FOLLOWING RIGHT KNEE JOINT REPLACEMENT SURGERY: ICD-10-CM

## 2023-11-14 DIAGNOSIS — M17.11 PRIMARY OSTEOARTHRITIS OF RIGHT KNEE: Primary | ICD-10-CM

## 2023-12-05 ENCOUNTER — HOSPITAL ENCOUNTER (OUTPATIENT)
Dept: RADIOLOGY | Facility: HOSPITAL | Age: 68
Discharge: HOME | End: 2023-12-05
Attending: ORTHOPAEDIC SURGERY
Payer: MEDICARE

## 2023-12-05 ENCOUNTER — OFFICE VISIT (OUTPATIENT)
Dept: ORTHOPEDICS | Facility: CLINIC | Age: 68
End: 2023-12-05
Payer: MEDICARE

## 2023-12-05 VITALS
HEART RATE: 66 BPM | WEIGHT: 283.31 LBS | BODY MASS INDEX: 41.96 KG/M2 | HEIGHT: 69 IN | DIASTOLIC BLOOD PRESSURE: 93 MMHG | SYSTOLIC BLOOD PRESSURE: 206 MMHG

## 2023-12-05 DIAGNOSIS — Z47.1 AFTERCARE FOLLOWING RIGHT KNEE JOINT REPLACEMENT SURGERY: ICD-10-CM

## 2023-12-05 DIAGNOSIS — Z96.651 AFTERCARE FOLLOWING RIGHT KNEE JOINT REPLACEMENT SURGERY: ICD-10-CM

## 2023-12-05 DIAGNOSIS — M17.11 PRIMARY OSTEOARTHRITIS OF RIGHT KNEE: ICD-10-CM

## 2023-12-05 DIAGNOSIS — Z96.651 AFTERCARE FOLLOWING RIGHT KNEE JOINT REPLACEMENT SURGERY: Primary | ICD-10-CM

## 2023-12-05 DIAGNOSIS — Z47.1 AFTERCARE FOLLOWING RIGHT KNEE JOINT REPLACEMENT SURGERY: Primary | ICD-10-CM

## 2023-12-05 PROCEDURE — 73562 X-RAY EXAM OF KNEE 3: CPT | Mod: 26,59,RT, | Performed by: RADIOLOGY

## 2023-12-05 PROCEDURE — 99213 OFFICE O/P EST LOW 20 MIN: CPT | Mod: PBBFAC,PN | Performed by: ORTHOPAEDIC SURGERY

## 2023-12-05 PROCEDURE — 77073 BONE LENGTH STUDIES: CPT | Mod: 26,,, | Performed by: RADIOLOGY

## 2023-12-05 PROCEDURE — 99214 OFFICE O/P EST MOD 30 MIN: CPT | Mod: S$PBB,,, | Performed by: ORTHOPAEDIC SURGERY

## 2023-12-05 PROCEDURE — 77073 XR HIP TO ANKLE: ICD-10-PCS | Mod: 26,,, | Performed by: RADIOLOGY

## 2023-12-05 PROCEDURE — 99999 PR PBB SHADOW E&M-EST. PATIENT-LVL III: ICD-10-PCS | Mod: PBBFAC,,, | Performed by: ORTHOPAEDIC SURGERY

## 2023-12-05 PROCEDURE — 73562 X-RAY EXAM OF KNEE 3: CPT | Mod: TC,PN,RT

## 2023-12-05 PROCEDURE — 99999 PR PBB SHADOW E&M-EST. PATIENT-LVL III: CPT | Mod: PBBFAC,,, | Performed by: ORTHOPAEDIC SURGERY

## 2023-12-05 PROCEDURE — 99214 PR OFFICE/OUTPT VISIT, EST, LEVL IV, 30-39 MIN: ICD-10-PCS | Mod: S$PBB,,, | Performed by: ORTHOPAEDIC SURGERY

## 2023-12-05 PROCEDURE — 77073 BONE LENGTH STUDIES: CPT | Mod: TC,PN

## 2023-12-05 PROCEDURE — 73562 XR KNEE 3 VIEW RIGHT: ICD-10-PCS | Mod: 26,59,RT, | Performed by: RADIOLOGY

## 2023-12-05 NOTE — PROGRESS NOTES
Subjective:      Patient ID: Diallo Castano is a 67 y.o. male.    Chief Complaint: Pain of the Right Knee    Patient is 6 months s/p  right primary total knee replacement  Anterior knee pain: Yes  Has improved pain  Is in physical therapy  No  Problems w incision  no  Is  happy with result  Yes  Opiod free: Yes         Social History     Occupational History    Not on file   Tobacco Use    Smoking status: Never    Smokeless tobacco: Never   Substance and Sexual Activity    Alcohol use: Yes    Drug use: Never    Sexual activity: Not on file      Review of Systems   Constitutional: Negative for diaphoresis.   HENT:  Negative for ear discharge, nosebleeds and stridor.    Eyes:  Negative for photophobia.   Cardiovascular:  Negative for syncope.   Respiratory:  Negative for hemoptysis, shortness of breath and wheezing.    Neurological:  Negative for tremors.   Psychiatric/Behavioral: Negative.           Objective:    General    Constitutional: He is oriented to person, place, and time. He appears well-developed and well-nourished.   HENT:   Head: Normocephalic and atraumatic.   Eyes: EOM are normal.   Pulmonary/Chest: Effort normal.   Neurological: He is alert and oriented to person, place, and time.   Psychiatric: He has a normal mood and affect. His behavior is normal. Judgment and thought content normal.     General Musculoskeletal Exam   Gait: normal       Right Knee Exam     Inspection   Erythema: absent  Scars: present  Swelling: absent  Effusion: absent  Deformity: absent  Bruising: absent    Range of Motion   Extension:  0   Flexion:  110     Tests   Ligament Examination   PCL-Posterior Drawer: normal (0 to 2mm)     MCL - Valgus: normal (0 to 2mm)  LCL - Varus: normal  Patella   Passive Patellar Tilt: neutral  Patellar Tracking: normal    Other   Sensation: normal    Comments:    Incision well healed    Vascular Exam       Edema  Right Lower Leg: absent         Assessment:       1. Aftercare following right  knee joint replacement surgery          Plan:       Overall patient appears to be doing well and is happy with the result of the knee arthroplasty. They can continue activities as tolerated avoiding high impact activities.  I would like to see the patient back In 6 months with xrays.

## (undated) DEVICE — SYR 10CC LUER LOCK

## (undated) DEVICE — ALCOHOL 70% ISOP W/GREEN 16OZ

## (undated) DEVICE — DRESSING COVER AQUACEL AG SURG

## (undated) DEVICE — MANIFOLD 4 PORT

## (undated) DEVICE — DURAPREP SURG SCRUB 26ML

## (undated) DEVICE — BLADE 90X13X1.27MM

## (undated) DEVICE — GAUZE SPONGE 4X4 12PLY

## (undated) DEVICE — NDL 22GA X1 1/2 REG BEVEL

## (undated) DEVICE — SYS CLSR DERMABOND PRINEO 22CM

## (undated) DEVICE — GLOVE BIOGEL ORTHOPEDIC 7.5

## (undated) DEVICE — NDL FILTER MICRON 18G 1 1/2

## (undated) DEVICE — SOL IRR NACL .9% 3000ML

## (undated) DEVICE — Device

## (undated) DEVICE — HOOD T-5 TEAR AWAY STERILE

## (undated) DEVICE — INTERPULSE SET

## (undated) DEVICE — BATTERY INSTRUMENT

## (undated) DEVICE — GLOVE BIOGEL PI MICRO INDIC 8

## (undated) DEVICE — CARD UNIV KNEE NAVGTN SW-SCL L

## (undated) DEVICE — ADAPTER DUAL IRRIGATION

## (undated) DEVICE — GLOVE SURG BIOGEL LATEX SZ 7.5

## (undated) DEVICE — COVER OVERHEAD SURG LT BLUE

## (undated) DEVICE — SYR ONLY LUER LOCK 20CC

## (undated) DEVICE — SUT STRATAFIX PDS 1 CTX 18IN

## (undated) DEVICE — ELECTRODE REM PLYHSV RETURN 9

## (undated) DEVICE — YANKAUER OPEN TIP W/O VENT

## (undated) DEVICE — SUT CTD VICRYL 2.0

## (undated) DEVICE — SET DECANTER MEDICHOICE

## (undated) DEVICE — DRAPE INCISE IOBAN 2 23X23IN

## (undated) DEVICE — SCRUB 10% POVIDONE IODINE 4OZ

## (undated) DEVICE — PAD PREP CUFFED NS 24X48IN

## (undated) DEVICE — INJECTION SODIUM CHLORIDE 50ML

## (undated) DEVICE — DRAPE CASSETTE 20 X 40

## (undated) DEVICE — SUT VICRYL 1 OB 36 CTX

## (undated) DEVICE — DRAPE TIBURON ORTHOPEDIC SPLIT

## (undated) DEVICE — COVER BACK TBL HD 2-TIER 72IN

## (undated) DEVICE — CONTAINER SPECIMEN STRL 3OZ

## (undated) DEVICE — NDL 18GA X1 1/2 REG BEVEL

## (undated) DEVICE — STRIP MEDI WND CLSR 1/2X4IN